# Patient Record
Sex: MALE | Race: WHITE | NOT HISPANIC OR LATINO | URBAN - METROPOLITAN AREA
[De-identification: names, ages, dates, MRNs, and addresses within clinical notes are randomized per-mention and may not be internally consistent; named-entity substitution may affect disease eponyms.]

---

## 2017-05-25 ENCOUNTER — OUTPATIENT (OUTPATIENT)
Dept: OUTPATIENT SERVICES | Facility: HOSPITAL | Age: 77
LOS: 1 days | Discharge: HOME | End: 2017-05-25

## 2017-06-28 DIAGNOSIS — B20 HUMAN IMMUNODEFICIENCY VIRUS [HIV] DISEASE: ICD-10-CM

## 2017-08-03 ENCOUNTER — OUTPATIENT (OUTPATIENT)
Dept: OUTPATIENT SERVICES | Facility: HOSPITAL | Age: 77
LOS: 1 days | Discharge: HOME | End: 2017-08-03

## 2017-08-03 DIAGNOSIS — B20 HUMAN IMMUNODEFICIENCY VIRUS [HIV] DISEASE: ICD-10-CM

## 2017-08-03 DIAGNOSIS — T40.1X1A POISONING BY HEROIN, ACCIDENTAL (UNINTENTIONAL), INITIAL ENCOUNTER: ICD-10-CM

## 2018-01-03 ENCOUNTER — INPATIENT (INPATIENT)
Facility: HOSPITAL | Age: 78
LOS: 5 days | Discharge: HOME | End: 2018-01-09
Attending: INTERNAL MEDICINE

## 2018-01-03 DIAGNOSIS — T40.1X1A POISONING BY HEROIN, ACCIDENTAL (UNINTENTIONAL), INITIAL ENCOUNTER: ICD-10-CM

## 2018-01-03 DIAGNOSIS — Z21 ASYMPTOMATIC HUMAN IMMUNODEFICIENCY VIRUS [HIV] INFECTION STATUS: ICD-10-CM

## 2018-01-12 DIAGNOSIS — F11.23 OPIOID DEPENDENCE WITH WITHDRAWAL: ICD-10-CM

## 2018-01-12 DIAGNOSIS — M10.9 GOUT, UNSPECIFIED: ICD-10-CM

## 2018-01-12 DIAGNOSIS — E87.6 HYPOKALEMIA: ICD-10-CM

## 2018-01-12 DIAGNOSIS — E83.42 HYPOMAGNESEMIA: ICD-10-CM

## 2018-01-12 DIAGNOSIS — T51.91XA TOXIC EFFECT OF UNSPECIFIED ALCOHOL, ACCIDENTAL (UNINTENTIONAL), INITIAL ENCOUNTER: ICD-10-CM

## 2018-01-12 DIAGNOSIS — B20 HUMAN IMMUNODEFICIENCY VIRUS [HIV] DISEASE: ICD-10-CM

## 2018-01-12 DIAGNOSIS — C85.90 NON-HODGKIN LYMPHOMA, UNSPECIFIED, UNSPECIFIED SITE: ICD-10-CM

## 2018-01-12 DIAGNOSIS — N17.9 ACUTE KIDNEY FAILURE, UNSPECIFIED: ICD-10-CM

## 2018-01-12 DIAGNOSIS — T40.7X1A POISONING BY CANNABIS (DERIVATIVES), ACCIDENTAL (UNINTENTIONAL), INITIAL ENCOUNTER: ICD-10-CM

## 2018-01-12 DIAGNOSIS — F41.1 GENERALIZED ANXIETY DISORDER: ICD-10-CM

## 2018-01-12 DIAGNOSIS — T40.1X1A POISONING BY HEROIN, ACCIDENTAL (UNINTENTIONAL), INITIAL ENCOUNTER: ICD-10-CM
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
## 2018-01-12 DIAGNOSIS — I12.9 HYPERTENSIVE CHRONIC KIDNEY DISEASE WITH STAGE 1 THROUGH STAGE 4 CHRONIC KIDNEY DISEASE, OR UNSPECIFIED CHRONIC KIDNEY DISEASE: ICD-10-CM

## 2018-01-12 DIAGNOSIS — H10.9 UNSPECIFIED CONJUNCTIVITIS: ICD-10-CM

## 2018-01-12 DIAGNOSIS — G92 TOXIC ENCEPHALOPATHY: ICD-10-CM

## 2018-01-12 DIAGNOSIS — F10.20 ALCOHOL DEPENDENCE, UNCOMPLICATED: ICD-10-CM
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
## 2018-05-31 ENCOUNTER — OUTPATIENT (OUTPATIENT)
Dept: OUTPATIENT SERVICES | Facility: HOSPITAL | Age: 78
LOS: 1 days | Discharge: HOME | End: 2018-05-31

## 2018-06-01 DIAGNOSIS — B20 HUMAN IMMUNODEFICIENCY VIRUS [HIV] DISEASE: ICD-10-CM

## 2018-10-04 ENCOUNTER — OUTPATIENT (OUTPATIENT)
Dept: OUTPATIENT SERVICES | Facility: HOSPITAL | Age: 78
LOS: 1 days | Discharge: HOME | End: 2018-10-04

## 2018-10-04 DIAGNOSIS — N42.9 DISORDER OF PROSTATE, UNSPECIFIED: ICD-10-CM

## 2018-10-04 DIAGNOSIS — B20 HUMAN IMMUNODEFICIENCY VIRUS [HIV] DISEASE: ICD-10-CM

## 2019-07-18 ENCOUNTER — OUTPATIENT (OUTPATIENT)
Dept: OUTPATIENT SERVICES | Facility: HOSPITAL | Age: 79
LOS: 1 days | Discharge: HOME | End: 2019-07-18

## 2019-07-18 ENCOUNTER — INPATIENT (INPATIENT)
Facility: HOSPITAL | Age: 79
LOS: 5 days | Discharge: NURSING, REHAB OR LONG-TERM CARE | End: 2019-07-24
Attending: INTERNAL MEDICINE | Admitting: INTERNAL MEDICINE
Payer: MEDICARE

## 2019-07-18 VITALS
OXYGEN SATURATION: 99 % | RESPIRATION RATE: 18 BRPM | HEART RATE: 70 BPM | TEMPERATURE: 97 F | DIASTOLIC BLOOD PRESSURE: 62 MMHG | SYSTOLIC BLOOD PRESSURE: 117 MMHG

## 2019-07-18 DIAGNOSIS — B20 HUMAN IMMUNODEFICIENCY VIRUS [HIV] DISEASE: ICD-10-CM

## 2019-07-18 LAB
ALBUMIN SERPL ELPH-MCNC: 3.2 G/DL — LOW (ref 3.5–5.2)
ALP SERPL-CCNC: 120 U/L — HIGH (ref 30–115)
ALT FLD-CCNC: 12 U/L — SIGNIFICANT CHANGE UP (ref 0–41)
ANION GAP SERPL CALC-SCNC: 11 MMOL/L — SIGNIFICANT CHANGE UP (ref 7–14)
AST SERPL-CCNC: 26 U/L — SIGNIFICANT CHANGE UP (ref 0–41)
BASOPHILS # BLD AUTO: 0.01 K/UL — SIGNIFICANT CHANGE UP (ref 0–0.2)
BASOPHILS NFR BLD AUTO: 0.1 % — SIGNIFICANT CHANGE UP (ref 0–1)
BILIRUB SERPL-MCNC: 0.6 MG/DL — SIGNIFICANT CHANGE UP (ref 0.2–1.2)
BUN SERPL-MCNC: 22 MG/DL — HIGH (ref 10–20)
CALCIUM SERPL-MCNC: 8.5 MG/DL — SIGNIFICANT CHANGE UP (ref 8.5–10.1)
CHLORIDE SERPL-SCNC: 107 MMOL/L — SIGNIFICANT CHANGE UP (ref 98–110)
CO2 SERPL-SCNC: 20 MMOL/L — SIGNIFICANT CHANGE UP (ref 17–32)
CREAT SERPL-MCNC: 1.4 MG/DL — SIGNIFICANT CHANGE UP (ref 0.7–1.5)
EOSINOPHIL # BLD AUTO: 0.01 K/UL — SIGNIFICANT CHANGE UP (ref 0–0.7)
EOSINOPHIL NFR BLD AUTO: 0.1 % — SIGNIFICANT CHANGE UP (ref 0–8)
ERYTHROCYTE [SEDIMENTATION RATE] IN BLOOD: 33 MM/HR — HIGH (ref 0–10)
GLUCOSE SERPL-MCNC: 112 MG/DL — HIGH (ref 70–99)
HCT VFR BLD CALC: 36.8 % — LOW (ref 42–52)
HGB BLD-MCNC: 11.7 G/DL — LOW (ref 14–18)
IMM GRANULOCYTES NFR BLD AUTO: 0.7 % — HIGH (ref 0.1–0.3)
LACTATE SERPL-SCNC: 1.6 MMOL/L — SIGNIFICANT CHANGE UP (ref 0.5–2.2)
LYMPHOCYTES # BLD AUTO: 0.5 K/UL — LOW (ref 1.2–3.4)
LYMPHOCYTES # BLD AUTO: 5.3 % — LOW (ref 20.5–51.1)
MCHC RBC-ENTMCNC: 28.1 PG — SIGNIFICANT CHANGE UP (ref 27–31)
MCHC RBC-ENTMCNC: 31.8 G/DL — LOW (ref 32–37)
MCV RBC AUTO: 88.2 FL — SIGNIFICANT CHANGE UP (ref 80–94)
MONOCYTES # BLD AUTO: 0.57 K/UL — SIGNIFICANT CHANGE UP (ref 0.1–0.6)
MONOCYTES NFR BLD AUTO: 6 % — SIGNIFICANT CHANGE UP (ref 1.7–9.3)
NEUTROPHILS # BLD AUTO: 8.27 K/UL — HIGH (ref 1.4–6.5)
NEUTROPHILS NFR BLD AUTO: 87.8 % — HIGH (ref 42.2–75.2)
NRBC # BLD: 0 /100 WBCS — SIGNIFICANT CHANGE UP (ref 0–0)
PLATELET # BLD AUTO: 184 K/UL — SIGNIFICANT CHANGE UP (ref 130–400)
POTASSIUM SERPL-MCNC: 3.8 MMOL/L — SIGNIFICANT CHANGE UP (ref 3.5–5)
POTASSIUM SERPL-SCNC: 3.8 MMOL/L — SIGNIFICANT CHANGE UP (ref 3.5–5)
PROT SERPL-MCNC: 6.4 G/DL — SIGNIFICANT CHANGE UP (ref 6–8)
RBC # BLD: 4.17 M/UL — LOW (ref 4.7–6.1)
RBC # FLD: 14.4 % — SIGNIFICANT CHANGE UP (ref 11.5–14.5)
SODIUM SERPL-SCNC: 138 MMOL/L — SIGNIFICANT CHANGE UP (ref 135–146)
WBC # BLD: 9.43 K/UL — SIGNIFICANT CHANGE UP (ref 4.8–10.8)
WBC # FLD AUTO: 9.43 K/UL — SIGNIFICANT CHANGE UP (ref 4.8–10.8)

## 2019-07-18 PROCEDURE — 73610 X-RAY EXAM OF ANKLE: CPT | Mod: 26,LT

## 2019-07-18 PROCEDURE — 73590 X-RAY EXAM OF LOWER LEG: CPT | Mod: 26,LT

## 2019-07-18 PROCEDURE — 71045 X-RAY EXAM CHEST 1 VIEW: CPT | Mod: 26

## 2019-07-18 PROCEDURE — 93010 ELECTROCARDIOGRAM REPORT: CPT

## 2019-07-18 PROCEDURE — 70450 CT HEAD/BRAIN W/O DYE: CPT | Mod: 26

## 2019-07-18 PROCEDURE — 73552 X-RAY EXAM OF FEMUR 2/>: CPT | Mod: 26,LT

## 2019-07-18 PROCEDURE — 72170 X-RAY EXAM OF PELVIS: CPT | Mod: 26

## 2019-07-18 PROCEDURE — 73562 X-RAY EXAM OF KNEE 3: CPT | Mod: 26,LT

## 2019-07-18 PROCEDURE — 73700 CT LOWER EXTREMITY W/O DYE: CPT | Mod: 26,LT

## 2019-07-18 PROCEDURE — 99285 EMERGENCY DEPT VISIT HI MDM: CPT

## 2019-07-18 RX ORDER — ERGOCALCIFEROL 1.25 MG/1
1 CAPSULE ORAL
Qty: 0 | Refills: 0 | DISCHARGE

## 2019-07-18 RX ORDER — FOLIC ACID 0.8 MG
1 TABLET ORAL
Qty: 0 | Refills: 0 | DISCHARGE

## 2019-07-18 RX ORDER — ERGOCALCIFEROL 1.25 MG/1
50000 CAPSULE ORAL
Refills: 0 | Status: DISCONTINUED | OUTPATIENT
Start: 2019-07-18 | End: 2019-07-24

## 2019-07-18 RX ORDER — CHLORHEXIDINE GLUCONATE 213 G/1000ML
1 SOLUTION TOPICAL
Refills: 0 | Status: DISCONTINUED | OUTPATIENT
Start: 2019-07-18 | End: 2019-07-24

## 2019-07-18 RX ORDER — QUETIAPINE FUMARATE 200 MG/1
0 TABLET, FILM COATED ORAL
Qty: 0 | Refills: 0 | DISCHARGE

## 2019-07-18 RX ORDER — FOLIC ACID 0.8 MG
1 TABLET ORAL DAILY
Refills: 0 | Status: DISCONTINUED | OUTPATIENT
Start: 2019-07-18 | End: 2019-07-24

## 2019-07-18 RX ORDER — HEPARIN SODIUM 5000 [USP'U]/ML
5000 INJECTION INTRAVENOUS; SUBCUTANEOUS EVERY 8 HOURS
Refills: 0 | Status: DISCONTINUED | OUTPATIENT
Start: 2019-07-18 | End: 2019-07-20

## 2019-07-18 RX ORDER — ABACAVIR SULFATE, DOLUTEGRAVIR SODIUM, LAMIVUDINE 60-5-30 MG
1 KIT ORAL
Qty: 0 | Refills: 0 | DISCHARGE

## 2019-07-18 RX ORDER — QUETIAPINE FUMARATE 200 MG/1
25 TABLET, FILM COATED ORAL DAILY
Refills: 0 | Status: DISCONTINUED | OUTPATIENT
Start: 2019-07-18 | End: 2019-07-21

## 2019-07-18 RX ORDER — QUETIAPINE FUMARATE 200 MG/1
0.5 TABLET, FILM COATED ORAL
Qty: 0 | Refills: 0 | DISCHARGE

## 2019-07-18 RX ADMIN — Medication 1 TABLET(S): at 22:00

## 2019-07-18 RX ADMIN — HEPARIN SODIUM 5000 UNIT(S): 5000 INJECTION INTRAVENOUS; SUBCUTANEOUS at 22:00

## 2019-07-18 NOTE — ED PROVIDER NOTE - OBJECTIVE STATEMENT
80 yo female, pmh of htn, gout, hiv followed by dr. samuels, presents to ed for left knee pain/. As per son isidro falls over past month, left knee pain since 14 days ago, worse with movement and weight bearing, min relief with otc meds, described as aching. Denies head injury, loc, rib pain, other joint pain, back pain, neck pain, qabd pain, nvd, ha, dizziness, numbness, tingling, rash, dysuria, nasal congestion, sore throat, fever, chills, redness. 80 yo male, pmh of htn, gout, hiv followed by dr. samuels, presents to ed for left knee pain/. As per son isidro falls over past month, left knee pain since 14 days ago, worse with movement and weight bearing, min relief with otc meds, described as aching. Denies head injury, loc, rib pain, other joint pain, back pain, neck pain, qabd pain, nvd, ha, dizziness, numbness, tingling, rash, dysuria, nasal congestion, sore throat, fever, chills, redness.

## 2019-07-18 NOTE — H&P ADULT - NSHPLABSRESULTS_GEN_ALL_CORE
11.7   9.43  )-----------( 184      ( 18 Jul 2019 13:00 )             36.8     138  |  107  |  22<H>  ----------------------------<  112<H>  3.8   |  20  |  1.4    Ca    8.5      18 Jul 2019 13:00    TPro  6.4  /  Alb  3.2<L>  /  TBili  0.6  /  DBili  x   /  AST  26  /  ALT  12  /  AlkPhos  120<H>  07-18    Lactate Trend  07-18 @ 13:00 Lactate:1.6     CAPILLARY BLOOD GLUCOSE    < from: CT Head No Cont (07.18.19 @ 14:34) >  IMPRESSION:   1.  Periventricular and subcortical white matter chronic small vessel   ischemic changes.  2.  No acute fractures, mass effect, midline shift or hemorrhage.    < end of copied text >    < from: Xray Knee 3 Views, Left (07.18.19 @ 13:04) >  Impression:  Comminuted medial femoral condyle fracture.  < end of copied text >

## 2019-07-18 NOTE — ED PROVIDER NOTE - NS ED ROS FT
Constitutional: (-) fever, (-) chills,   Eyes: (-) visual changes  ENT: (-)(-) nasal congestions, (-) sore throat   Cardiovascular: (-) chest pain, (-) syncope  Respiratory: (-) cough, (-) shortness of breath, (-) dyspnea,   Gastrointestinal: (-) vomiting, (-) diarrhea, (-)nausea,  Musculoskeletal: (-) neck pain, (-) back pain, (+) left knee pain   Integumentary: (-) rash, (-)color changes  Neurological: (-) headache, (-)loc, (-) dizziness, (-) tingling, (-)numbness, I  Peripheral Vascular: (-) pain while walking, (+) leg swelling, (-) color changes  :  (-)dysuria,   Allergic/Immunologic: (-) pruritus

## 2019-07-18 NOTE — ED PROVIDER NOTE - CARE PLAN
Principal Discharge DX:	Femoral condyle fracture  Secondary Diagnosis:	Inability to ambulate due to left knee Principal Discharge DX:	Femoral condyle fracture  Secondary Diagnosis:	Inability to ambulate due to left knee  Secondary Diagnosis:	Fall

## 2019-07-18 NOTE — ED PROVIDER NOTE - PHYSICAL EXAMINATION
Physical Exam    Vital Signs: I have reviewed the initial vital signs.  Constitutional: well-nourished, appears stated age, no acute distress  Eyes: Conjunctiva pink, Sclera clear, PERRLA, EOMI.  Cardiovascular: S1 and S2, regular rate, regular rhythm, well-perfused extremities, radial pulses equal and 2+  Respiratory: unlabored respiratory effort, clear to auscultation bilaterally no wheezing, rales and rhonchi  Gastrointestinal: soft, non-tender abdomen, no pulsatile mass, normal bowl sounds  Musculoskeletal: supple neck, no lower extremity edema, no midline tenderness, no c spine ttp, neck from, left knee rom limited 2/2 pain, other joints from.  Integumentary: warm, dry, no rash, effusion over left knee without redness or warmth  Neurologic: awake, alert, cranial nerves II-XII grossly intact, extremities’ motor and sensory functions grossly intact  Psychiatric: appropriate mood, appropriate affect

## 2019-07-18 NOTE — ED ADULT NURSE NOTE - OBJECTIVE STATEMENT
pt presents with left knee pain , left lower extremity swelling with decrease in activity over the last 2 months. pt denies chest pain or SOB. As per the son, pt hasn't been able to use his walker recently due to weakness and pain.

## 2019-07-18 NOTE — PROGRESS NOTE ADULT - ASSESSMENT
ASSESSMENT  80 yo M with AIDS, gout, HTN, active heroin use (snorting, 1mn ago), admitted after a fall a week ago, found to have a left medial epicondylar fx w/ intra-articular extension to trochlear groove    RECOMMENDATIONS  #LLE fracture  - appreciate Ortho following, f/u final plan  - CT neg    #Symptomatic AIDS  - CD4/VL sent from HIV clinic today  - abacavir 600 mG/dolutegravir 50 mG/lamiVUDine 300 mG 1  - trimethoprim   80 mG/sulfamethoxazole 400 mG 1    #Mood disorder, Etoh, drug use  - QUEtiapine 25  - Thiamine 100mg daily      Spectra [phone]

## 2019-07-18 NOTE — ED PROVIDER NOTE - PROGRESS NOTE DETAILS
ortho c/s, spoke with stephanie cruz ct scan of left extremity and knee immobilizer MAR aware of admission, ortho will follow, dr. taylor of [facility] aware of admission under dolly's service Spoke to CODY Álvarez about ct findings, aware. MAR, WIll discuss with pt and fu with ortho and follow vascular rec.

## 2019-07-18 NOTE — ED PROVIDER NOTE - ATTENDING CONTRIBUTION TO CARE
78 y/o male h/o HTN, Gout, HIVD, lives with son who reports multiple recent falls, now presents with left knee pain. Pain is constant, worse with certain movements and position, better with rest, he normally ambulates outside the house but has been ambulating only to the bathroom and with a walker since the pain started, denies LOC, paresthesias or other complaints at present.     No old chart available for review.  I have reviewed and agree with the nursing note, except as documented in my note.    VSS, awake, alert, non-toxic appearing, Head NC / NT, PERRL / EOMI, no dental injury, chest CTAB, chest wall NT, no w/r/r, +S1/S2, RRR, no m/r/g, abdomen soft, NT, ND, +BS, able to voluntarily actively rotate neck 45 degrees left and right on request, no midline spinal tenderness, no CVA tenderness, LLE; no hip, ankle or foot tenderness, lft knee; grossly swollen and diffusley ttp, slight ecchymoses, no gross deformity, crepitus, joint line tenderness, able to partially flex and extend knee with encouragement, motor and sensation intact distally, NV intact with 2+ DP pulses, no warmth, erythema, induration, fluctuance, lymphangitis or purulence, otherwise FROM all other extremities and no bony tenderness, alert and oriented to person, place, clear speech, cranial nerves II-XII are intact.

## 2019-07-18 NOTE — ED ADULT NURSE NOTE - NSIMPLEMENTINTERV_GEN_ALL_ED
Implemented All Fall with Harm Risk Interventions:  [place] to call system. Call bell, personal items and telephone within reach. Instruct patient to call for assistance. Room bathroom lighting operational. Non-slip footwear when patient is off stretcher. Physically safe environment: no spills, clutter or unnecessary equipment. Stretcher in lowest position, wheels locked, appropriate side rails in place. Provide visual cue, wrist band, yellow gown, etc. Monitor gait and stability. Monitor for mental status changes and reorient to person, place, and time. Review medications for side effects contributing to fall risk. Reinforce activity limits and safety measures with patient and family. Provide visual clues: red socks.

## 2019-07-18 NOTE — PROGRESS NOTE ADULT - SUBJECTIVE AND OBJECTIVE BOX
EUGENIEDARY  79y, Male  Allergy: No Known Allergies      CHIEF COMPLAINT: Left knee swelling and Pain (18 Jul 2019 16:11)      HPI:  80 yo M with AIDS, gout, HTN, active heroin use (snorting, 1mn ago), admitted after a fall a week ago, found to have a left medial epicondylar fx w/ intra-articular extension to trochlear groove. Reports that he tripped on his outdoor step and hit his head. No LOC. Denies any other recent falls. Pt denies HA, rhinorrhea, sore throat, cough, SOB, abd pain, diarrhea, n/v, dysuria, rash, arthralgias. No fever or chills. Reports adherence to HIV meds. Diagnosis was recent. Last seen in clinic 10/2018 and was seen today where labs were drawn and he was sent to the ER.    PAST MEDICAL & SURGICAL HISTORY:  Gout  HTN (hypertension)  HIV (human immunodeficiency virus infection)      FAMILY HISTORY      SOCIAL HISTORY    Substance Use (  ) never used  (  ) IVDU ( x ) Other: snorts heroin, last use 1 mn ago  Tobacco Usage:  (   ) never smoked   (   ) former smoker   ( x  ) current smoker   Alcohol Usage: ( x  ) social  (   ) daily use (   ) denies  Sexual History: na      ROS  General: Denies rigors, nightsweats  HEENT: Denies headache, rhinorrhea, sore throat, eye pain  CV: Denies CP, palpitations  PULM: Denies SOB, wheezing  GI: Denies abdominal pain, hematochezia/melena  : Denies dysuria, hematuria  MSK: as noted above   SKIN: Denies rash, lesions  NEURO: Denies paresthesias, weakness  PSYCH: Denies depression, anxiety    VITALS:  T(F): 96, Max: 97.4 (07-18-19 @ 12:03)  HR: 67  BP: 108/76  RR: 18Vital Signs Last 24 Hrs  T(C): 35.6 (18 Jul 2019 15:00), Max: 36.3 (18 Jul 2019 12:03)  T(F): 96 (18 Jul 2019 15:00), Max: 97.4 (18 Jul 2019 12:03)  HR: 67 (18 Jul 2019 15:00) (67 - 70)  BP: 108/76 (18 Jul 2019 15:00) (108/76 - 117/62)  BP(mean): --  RR: 18 (18 Jul 2019 15:00) (18 - 18)  SpO2: 99% (18 Jul 2019 15:00) (99% - 99%)    PHYSICAL EXAM:  Gen: NAD, resting in bed  HEENT: Normocephalic, atraumatic  Neck: supple, no lymphadenopathy  CV: Regular rate & regular rhythm  Lungs: decreased BS at bases, no fremitus  Abdomen: Soft, BS present  Ext: Warm, well perfused, LLE edema, in brace  Neuro: non focal, awake  Skin: no rash, no erythema  Lines: no phlebitis    TESTS & MEASUREMENTS:                        11.7   9.43  )-----------( 184      ( 18 Jul 2019 13:00 )             36.8     07-18    138  |  107  |  22<H>  ----------------------------<  112<H>  3.8   |  20  |  1.4    Ca    8.5      18 Jul 2019 13:00    TPro  6.4  /  Alb  3.2<L>  /  TBili  0.6  /  DBili  x   /  AST  26  /  ALT  12  /  AlkPhos  120<H>  07-18    eGFR if Non African American: 47 mL/min/1.73M2 (07-18-19 @ 13:00)  eGFR if African American: 54 mL/min/1.73M2 (07-18-19 @ 13:00)    LIVER FUNCTIONS - ( 18 Jul 2019 13:00 )  Alb: 3.2 g/dL / Pro: 6.4 g/dL / ALK PHOS: 120 U/L / ALT: 12 U/L / AST: 26 U/L / GGT: x                 Lactate, Blood: 1.6 mmol/L (07-18-19 @ 13:00)      INFECTIOUS DISEASES TESTING      RADIOLOGY & ADDITIONAL TESTS:  I have personally reviewed the last Chest xray  CXR      CT      CARDIOLOGY TESTING  12 Lead ECG:   Ventricular Rate 68 BPM    Atrial Rate 100 BPM    P-R Interval 180 ms    QRS Duration 104 ms    Q-T Interval 426 ms    QTC Calculation(Bezet) 452 ms    P Axis 65 degrees    R Axis -1 degrees    T Axis 27 degrees    Diagnosis Line *** Poor data quality, interpretation may be adversely affected  Sinus rhythm with Premature atrial complexes  Otherwise normal ECG    Confirmed by Addi Gay ([id number]) on 7/18/2019 2:48:29 PM (07-18-19 @ 14:33)      MEDICATIONS  abacavir 600 mG/dolutegravir 50 mG/lamiVUDine 300 mG 1  chlorhexidine 4% Liquid 1  ergocalciferol 31869  folic acid 1  heparin  Injectable 5000  multivitamin 1  predniSONE   Tablet 40  QUEtiapine 25  trimethoprim   80 mG/sulfamethoxazole 400 mG 1      ANTIBIOTICS:  abacavir 600 mG/dolutegravir 50 mG/lamiVUDine 300 mG 1 Tablet(s) Oral daily  trimethoprim   80 mG/sulfamethoxazole 400 mG 1 Tablet(s) Oral two times a day      No Known Allergies

## 2019-07-18 NOTE — H&P ADULT - HISTORY OF PRESENT ILLNESS
78 yo M  PMH: HTN (not on medications), gout (not on medications and HIV  cc: Left knee pain x 2 days  History: Patient notes left knee pain began two weeks ago. It is described as a dull sensation, tender to touch, nonradiated and constant. He notes he had a fall two days ago that worsened the pain. In addition patient notes that for the last week he has had swelling of the left knee area. He has never had swelling like this before in the past and the swelling began BEFORE the fall happened. He further states he has not taken any medications to help with pain or swelling. Patient notes that he uses a walker at home for ambulatory purposes. He notes the pain began before the fall and is worse with weight bearing activities. Patient notes this is not the first fall this month. Otherwise patient denies headaches fevers, chills, n/v/d/c, cp, palpitations sob, cough, weakness, numbness, tingling, abdominal or back pain. Patient denies dysuria, hematuria, or melena.

## 2019-07-18 NOTE — CONSULT NOTE ADULT - ATTENDING COMMENTS
Pt seen and examined.  Agree with resident exam and plan.  NWB LLE, pain control, already discussed with Dr. Stephenson.  He will review images for possible transfer.

## 2019-07-19 DIAGNOSIS — Z02.9 ENCOUNTER FOR ADMINISTRATIVE EXAMINATIONS, UNSPECIFIED: ICD-10-CM

## 2019-07-19 PROBLEM — I10 ESSENTIAL (PRIMARY) HYPERTENSION: Chronic | Status: ACTIVE | Noted: 2019-07-18

## 2019-07-19 PROBLEM — M10.9 GOUT, UNSPECIFIED: Chronic | Status: ACTIVE | Noted: 2019-07-18

## 2019-07-19 PROBLEM — B20 HUMAN IMMUNODEFICIENCY VIRUS [HIV] DISEASE: Chronic | Status: ACTIVE | Noted: 2019-07-18

## 2019-07-19 LAB
ANION GAP SERPL CALC-SCNC: 13 MMOL/L — SIGNIFICANT CHANGE UP (ref 7–14)
BASOPHILS # BLD AUTO: 0.02 K/UL — SIGNIFICANT CHANGE UP (ref 0–0.2)
BASOPHILS NFR BLD AUTO: 0.2 % — SIGNIFICANT CHANGE UP (ref 0–1)
BUN SERPL-MCNC: 23 MG/DL — HIGH (ref 10–20)
CALCIUM SERPL-MCNC: 8.3 MG/DL — LOW (ref 8.5–10.1)
CHLORIDE SERPL-SCNC: 106 MMOL/L — SIGNIFICANT CHANGE UP (ref 98–110)
CO2 SERPL-SCNC: 18 MMOL/L — SIGNIFICANT CHANGE UP (ref 17–32)
CREAT SERPL-MCNC: 1.4 MG/DL — SIGNIFICANT CHANGE UP (ref 0.7–1.5)
EOSINOPHIL # BLD AUTO: 0.06 K/UL — SIGNIFICANT CHANGE UP (ref 0–0.7)
EOSINOPHIL NFR BLD AUTO: 0.7 % — SIGNIFICANT CHANGE UP (ref 0–8)
GLUCOSE SERPL-MCNC: 102 MG/DL — HIGH (ref 70–99)
HCT VFR BLD CALC: 35.3 % — LOW (ref 42–52)
HGB BLD-MCNC: 11.6 G/DL — LOW (ref 14–18)
IMM GRANULOCYTES NFR BLD AUTO: 0.5 % — HIGH (ref 0.1–0.3)
LYMPHOCYTES # BLD AUTO: 1.13 K/UL — LOW (ref 1.2–3.4)
LYMPHOCYTES # BLD AUTO: 13.8 % — LOW (ref 20.5–51.1)
MAGNESIUM SERPL-MCNC: 1.9 MG/DL — SIGNIFICANT CHANGE UP (ref 1.8–2.4)
MCHC RBC-ENTMCNC: 28.4 PG — SIGNIFICANT CHANGE UP (ref 27–31)
MCHC RBC-ENTMCNC: 32.9 G/DL — SIGNIFICANT CHANGE UP (ref 32–37)
MCV RBC AUTO: 86.5 FL — SIGNIFICANT CHANGE UP (ref 80–94)
MONOCYTES # BLD AUTO: 0.49 K/UL — SIGNIFICANT CHANGE UP (ref 0.1–0.6)
MONOCYTES NFR BLD AUTO: 6 % — SIGNIFICANT CHANGE UP (ref 1.7–9.3)
NEUTROPHILS # BLD AUTO: 6.44 K/UL — SIGNIFICANT CHANGE UP (ref 1.4–6.5)
NEUTROPHILS NFR BLD AUTO: 78.8 % — HIGH (ref 42.2–75.2)
NRBC # BLD: 0 /100 WBCS — SIGNIFICANT CHANGE UP (ref 0–0)
PLATELET # BLD AUTO: 181 K/UL — SIGNIFICANT CHANGE UP (ref 130–400)
POTASSIUM SERPL-MCNC: 4 MMOL/L — SIGNIFICANT CHANGE UP (ref 3.5–5)
POTASSIUM SERPL-SCNC: 4 MMOL/L — SIGNIFICANT CHANGE UP (ref 3.5–5)
RBC # BLD: 4.08 M/UL — LOW (ref 4.7–6.1)
RBC # FLD: 14.5 % — SIGNIFICANT CHANGE UP (ref 11.5–14.5)
SODIUM SERPL-SCNC: 137 MMOL/L — SIGNIFICANT CHANGE UP (ref 135–146)
WBC # BLD: 8.18 K/UL — SIGNIFICANT CHANGE UP (ref 4.8–10.8)
WBC # FLD AUTO: 8.18 K/UL — SIGNIFICANT CHANGE UP (ref 4.8–10.8)

## 2019-07-19 PROCEDURE — 74177 CT ABD & PELVIS W/CONTRAST: CPT | Mod: 26

## 2019-07-19 PROCEDURE — 78320: CPT | Mod: 26

## 2019-07-19 PROCEDURE — 70491 CT SOFT TISSUE NECK W/DYE: CPT | Mod: 26

## 2019-07-19 PROCEDURE — 71260 CT THORAX DX C+: CPT | Mod: 26

## 2019-07-19 PROCEDURE — 78306 BONE IMAGING WHOLE BODY: CPT | Mod: 26

## 2019-07-19 RX ORDER — ACETAMINOPHEN 500 MG
650 TABLET ORAL EVERY 6 HOURS
Refills: 0 | Status: DISCONTINUED | OUTPATIENT
Start: 2019-07-19 | End: 2019-07-24

## 2019-07-19 RX ORDER — IOHEXOL 300 MG/ML
30 INJECTION, SOLUTION INTRAVENOUS ONCE
Refills: 0 | Status: COMPLETED | OUTPATIENT
Start: 2019-07-19 | End: 2019-07-19

## 2019-07-19 RX ADMIN — Medication 650 MILLIGRAM(S): at 09:07

## 2019-07-19 RX ADMIN — Medication 1 TABLET(S): at 13:55

## 2019-07-19 RX ADMIN — HEPARIN SODIUM 5000 UNIT(S): 5000 INJECTION INTRAVENOUS; SUBCUTANEOUS at 21:54

## 2019-07-19 RX ADMIN — ERGOCALCIFEROL 50000 UNIT(S): 1.25 CAPSULE ORAL at 14:54

## 2019-07-19 RX ADMIN — HEPARIN SODIUM 5000 UNIT(S): 5000 INJECTION INTRAVENOUS; SUBCUTANEOUS at 06:16

## 2019-07-19 RX ADMIN — CHLORHEXIDINE GLUCONATE 1 APPLICATION(S): 213 SOLUTION TOPICAL at 06:15

## 2019-07-19 RX ADMIN — QUETIAPINE FUMARATE 25 MILLIGRAM(S): 200 TABLET, FILM COATED ORAL at 13:53

## 2019-07-19 RX ADMIN — Medication 1 TABLET(S): at 06:15

## 2019-07-19 RX ADMIN — Medication 1 TABLET(S): at 18:19

## 2019-07-19 RX ADMIN — Medication 40 MILLIGRAM(S): at 06:15

## 2019-07-19 RX ADMIN — IOHEXOL 30 MILLILITER(S): 300 INJECTION, SOLUTION INTRAVENOUS at 16:27

## 2019-07-19 RX ADMIN — Medication 650 MILLIGRAM(S): at 18:21

## 2019-07-19 RX ADMIN — Medication 1 MILLIGRAM(S): at 13:56

## 2019-07-19 RX ADMIN — HEPARIN SODIUM 5000 UNIT(S): 5000 INJECTION INTRAVENOUS; SUBCUTANEOUS at 14:57

## 2019-07-19 RX ADMIN — Medication 650 MILLIGRAM(S): at 03:01

## 2019-07-19 NOTE — PROGRESS NOTE ADULT - SUBJECTIVE AND OBJECTIVE BOX
ORTHOPAEDIC FOLLOW UP NOTE    Per discussion with Dr. Stephenson. Plan is to:  - IR Guided biopsy today through direct lateral approach   - CT Neck, chest abdomen pelvis with and without contrast  - MRI entire femur with and without contrast  - Whole body bone scan   - CBC, CMP, ESR, CRP, LDH, SPEP/UPEP, PTH, CALCIUM, TSH, T3/T4. ORTHOPAEDIC FOLLOW UP NOTE    Per discussion with Dr. Stephenson. Plan is to:  - IR Guided biopsy today through direct lateral approach   - CT Neck, chest abdomen pelvis with and without contrast  - MRI entire femur with and without contrast  - Whole body bone scan   - CBC, CMP, ESR, CRP, LDH, SPEP/UPEP, PTH, CALCIUM, TSH, T3/T4.   left distal femur fx   pathologic??  lymphoma?  agree with workup

## 2019-07-19 NOTE — PROGRESS NOTE ADULT - SUBJECTIVE AND OBJECTIVE BOX
EUGENIEDARY  79y, Male  Allergy: No Known Allergies      CHIEF COMPLAINT: Left knee swelling and Pain (19 Jul 2019 09:23)      INTERVAL EVENTS/HPI  - No acute events overnight  - T(F): , Max: 97.9 (07-18-19 @ 23:00)  - Denies any worsening symptoms  - Tolerating medication  - WBC Count: 8.18 K/uL (07-19-19 @ 06:31)      ROS  General: Denies rigors, nightsweats  HEENT: Denies headache, rhinorrhea, sore throat, eye pain  CV: Denies CP, palpitations  PULM: Denies SOB, wheezing  GI: Denies abdominal pain, hematochezia/melena  : Denies dysuria, hematuria  MSK: Denies arthralgias, myalgias  SKIN: Denies rash, lesions  NEURO: Denies paresthesias, weakness  PSYCH: Denies depression, anxiety    VITALS:  T(F): 97.5, Max: 97.9 (07-18-19 @ 23:00)  HR: 67  BP: 125/72  RR: 18Vital Signs Last 24 Hrs  T(C): 36.4 (19 Jul 2019 08:09), Max: 36.6 (18 Jul 2019 23:00)  T(F): 97.5 (19 Jul 2019 08:09), Max: 97.9 (18 Jul 2019 23:00)  HR: 67 (19 Jul 2019 08:09) (67 - 73)  BP: 125/72 (19 Jul 2019 08:09) (108/76 - 155/91)  BP(mean): --  RR: 18 (19 Jul 2019 08:09) (18 - 18)  SpO2: 99% (18 Jul 2019 20:08) (99% - 99%)    PHYSICAL EXAM:  Gen: NAD, resting in bed  HEENT: Normocephalic, atraumatic  Neck: supple, no lymphadenopathy  CV: Regular rate & regular rhythm  Lungs: decreased BS at bases, no fremitus  Abdomen: Soft, BS present  Ext: Warm, well perfused, LLE edema, in brace  Neuro: non focal, awake  Skin: no rash, no erythema  Lines: no phlebitis    SOCIAL HISTORY  Substance Use (  ) never used  (  ) IVDU ( x ) Other: snorts heroin, last use 1 mn ago  Tobacco Usage:  (   ) never smoked   (   ) former smoker   ( x  ) current smoker   Alcohol Usage: ( x  ) social  (   ) daily use (   ) denies  Sexual History: na      FH: Non-contributory    TESTS & MEASUREMENTS:                        11.6   8.18  )-----------( 181      ( 19 Jul 2019 06:31 )             35.3     07-19    137  |  106  |  23<H>  ----------------------------<  102<H>  4.0   |  18  |  1.4    Ca    8.3<L>      19 Jul 2019 06:31  Mg     1.9     07-19    TPro  6.4  /  Alb  3.2<L>  /  TBili  0.6  /  DBili  x   /  AST  26  /  ALT  12  /  AlkPhos  120<H>  07-18    eGFR if Non African American: 47 mL/min/1.73M2 (07-19-19 @ 06:31)  eGFR if African American: 54 mL/min/1.73M2 (07-19-19 @ 06:31)    LIVER FUNCTIONS - ( 18 Jul 2019 13:00 )  Alb: 3.2 g/dL / Pro: 6.4 g/dL / ALK PHOS: 120 U/L / ALT: 12 U/L / AST: 26 U/L / GGT: x                 Lactate, Blood: 1.6 mmol/L (07-18-19 @ 13:00)      INFECTIOUS DISEASES TESTING      RADIOLOGY & ADDITIONAL TESTS:  I have personally reviewed the last Chest xray  CXR      CT      CARDIOLOGY TESTING  12 Lead ECG:   Ventricular Rate 68 BPM    Atrial Rate 100 BPM    P-R Interval 180 ms    QRS Duration 104 ms    Q-T Interval 426 ms    QTC Calculation(Bezet) 452 ms    P Axis 65 degrees    R Axis -1 degrees    T Axis 27 degrees    Diagnosis Line *** Poor data quality, interpretation may be adversely affected  Sinus rhythm with Premature atrial complexes  Otherwise normal ECG    Confirmed by Addi Gay ([id number]) on 7/18/2019 2:48:29 PM (07-18-19 @ 14:33)      MEDICATIONS  abacavir 600 mG/dolutegravir 50 mG/lamiVUDine 300 mG 1  chlorhexidine 4% Liquid 1  ergocalciferol 14180  folic acid 1  heparin  Injectable 5000  iohexol 300 mG (iodine)/mL Oral Solution 30  multivitamin 1  predniSONE   Tablet 40  QUEtiapine 25  trimethoprim   80 mG/sulfamethoxazole 400 mG 1      ANTIBIOTICS:  abacavir 600 mG/dolutegravir 50 mG/lamiVUDine 300 mG 1 Tablet(s) Oral daily  trimethoprim   80 mG/sulfamethoxazole 400 mG 1 Tablet(s) Oral two times a day

## 2019-07-19 NOTE — PROGRESS NOTE ADULT - ASSESSMENT
ASSESSMENT  78 yo M with AIDS, hx NHL, gout, HTN, active heroin use (snorting, 1mn ago), admitted after a fall a week ago, found to have a left medial epicondylar fx w/ intra-articular extension to trochlear groove, concerning for lymphoma, pathological fracture    RECOMMENDATIONS  #LLE fracture, concerning for pathological fx, lymphoma  - appreciate Ortho following  - IR Guided biopsy today through direct lateral approach   - CT Neck, chest abdomen pelvis with and without contrast  - MRI entire femur with and without contrast  - Whole body bone scan   - ESR, CRP, LDH, SPEP/UPEP, PTH, CALCIUM, TSH, T3/T4.   - CTH neg    #Symptomatic AIDS  - CD4/VL sent from HIV clinic  - abacavir 600 mG/dolutegravir 50 mG/lamiVUDine 300 mG 1  - trimethoprim   80 mG/sulfamethoxazole 400 mG 1    #Mood disorder, Etoh, drug use  - QUEtiapine 25  - Thiamine 100mg daily      Spectra [phone]

## 2019-07-19 NOTE — PROGRESS NOTE ADULT - ASSESSMENT
78 yo M with HIV, gout, HTN, active heroin use (snorting, 1mn ago), admitted after a fall two days ago, found to have a left medial epicondylar fx w/ intra-articular extension to trochlear groove, concerning for lymphoma, pathological fracture    RECOMMENDATIONS  #LLE fracture, concerning for pathological fx, lymphoma  - Ortho following  - IR Guided biopsy today through direct lateral approach   - CT Neck, chest abdomen pelvis with and without contrast  - MRI entire femur w/w/o contrast  - Whole body bone scan   - ESR 33 , calcium 8.3   -CRP, LDH, SPEP/UPEP, PTH,TSH, T3/T4.   - CTH neg  -Follow imaging results   -Monitor labs  -Working with PT     #Symptomatic AIDS  - CD4/VL pending from clinic   - abacavir 600 mG/dolutegravir 50 mG/lamiVUDine 300 mG 1  - trimethoprim  80 mG/sulfamethoxazole 400 mG 1    #Mood disorder, ETOH abuse  - QUEtiapine 25  - Thiamine 100mg daily 78 yo M with HIV, gout, HTN, active heroin use (snorting, 1mn ago), admitted after a fall two days ago, found to have a left medial epicondylar fx w/ intra-articular extension to trochlear groove, concerning for lymphoma, pathological fracture    RECOMMENDATIONS  #LLE fracture, concerning for pathological fx, lymphoma  - Ortho following  - IR Guided biopsy today through direct lateral approach   - CT Neck, chest abdomen pelvis with and without contrast  - MRI entire femur w/w/o contrast  - Whole body bone scan   - ESR 33 , calcium 8.3   -CRP, LDH, SPEP/UPEP, PTH,TSH, T3/T4.   - CTH neg  -Follow imaging results   -Monitor labs  -Working with PT     #Vitamin D and B Deficiency  -Vitamin B-100 oral tablet: 1 tab(s) orally once a day  -Vitamin D2 50,000 intl units (1.25 mg) oral capsule: 1 cap(s) orally once a week      #Symptomatic AIDS  - CD4/VL pending from clinic   - abacavir 600 mG/dolutegravir 50 mG/lamiVUDine 300 mG 1  - trimethoprim  80 mG/sulfamethoxazole 400 mG 1    #Mood disorder, ETOH abuse  - QUEtiapine 25  - Thiamine 100mg daily

## 2019-07-19 NOTE — PHYSICAL THERAPY INITIAL EVALUATION ADULT - GENERAL OBSERVATIONS, REHAB EVAL
Pt encountered supine in bed in NAD, no c/o pain, (+) KI LLE/NWB per ortho. Pt requires Mod A in bed mobility, Max A in transfer mobility and non-ambulatory at this time.

## 2019-07-19 NOTE — PROGRESS NOTE ADULT - SUBJECTIVE AND OBJECTIVE BOX
Hospital Day:  1d    Subjective:    Patient is a 79y old  Male who presents with a chief complaint of Left knee swelling and Pain (19 Jul 2019 14:00)      Past Medical Hx:   Gout  HTN (hypertension)  HIV (human immunodeficiency virus infection)    Past Sx:    Allergies:  No Known Allergies    Current Meds:   Standng Meds:  abacavir 600 mG/dolutegravir 50 mG/lamiVUDine 300 mG 1 Tablet(s) Oral daily  chlorhexidine 4% Liquid 1 Application(s) Topical <User Schedule>  ergocalciferol 66989 Unit(s) Oral every week  folic acid 1 milliGRAM(s) Oral daily  heparin  Injectable 5000 Unit(s) SubCutaneous every 8 hours  iohexol 300 mG (iodine)/mL Oral Solution 30 milliLiter(s) Oral once  multivitamin 1 Tablet(s) Oral daily  predniSONE   Tablet 40 milliGRAM(s) Oral daily  QUEtiapine 25 milliGRAM(s) Oral daily  trimethoprim   80 mG/sulfamethoxazole 400 mG 1 Tablet(s) Oral two times a day    PRN Meds:  acetaminophen   Tablet .. 650 milliGRAM(s) Oral every 6 hours PRN Mild Pain (1 - 3)    HOME MEDICATIONS:  Bactrim 400 mg-80 mg oral tablet: 2 tab(s) orally 2 times a day  folic acid 1 mg oral tablet: 1 tab(s) orally once a day  SEROquel XR 50 mg oral tablet, extended release: 0.5 tab(s) orally once a day (in the evening)  Triumeq oral tablet: 1 tab(s) orally once a day  Vitamin B-100 oral tablet: 1 tab(s) orally once a day  Vitamin D2 50,000 intl units (1.25 mg) oral capsule: 1 cap(s) orally once a week      Vital Signs:   T(F): 97.5 (07-19-19 @ 08:09), Max: 97.9 (07-18-19 @ 23:00)  HR: 67 (07-19-19 @ 08:09) (67 - 73)  BP: 125/72 (07-19-19 @ 08:09) (118/80 - 155/91)  RR: 18 (07-19-19 @ 08:09) (18 - 18)  SpO2: 99% (07-18-19 @ 20:08) (99% - 99%)      07-19-19 @ 07:01 - 07-19-19 @ 15:41  --------------------------------------------------------  IN: 0 mL / OUT: 426 mL / NET: -426 mL        Physical Exam:   GENERAL: NAD  HEENT: NCAT  CHEST/LUNG: CTAB  HEART: Regular rate and rhythm; s1 s2 appreciated, No murmurs, rubs, or gallops  ABDOMEN: Soft, Nontender, Nondistended; Bowel sounds present  EXTREMITIES: No LE edema b/l  NERVOUS SYSTEM:  Alert & Oriented X3        Labs:                         11.6   8.18  )-----------( 181      ( 19 Jul 2019 06:31 )             35.3     Neutophil% 78.8, Lymphocyte% 13.8, Monocyte% 6.0, Bands% 0.5 07-19-19 @ 06:31    19 Jul 2019 06:31    137    |  106    |  23     ----------------------------<  102    4.0     |  18     |  1.4      Ca    8.3        19 Jul 2019 06:31  Mg     1.9       19 Jul 2019 06:31    TPro  6.4    /  Alb  3.2    /  TBili  0.6    /  DBili  x      /  AST  26     /  ALT  12     /  AlkPhos  120    18 Jul 2019 13:00                            Radiology:       Assessment and Plan:     DVT ppx:    GI ppx:     Code Status:     DISPO: Hospital Day:  1d    Subjective:    A 79y old  Male who presents with a chief complaint of Left knee swelling and Pain. Left knee pain began two weeks ago, described as a dull sensation, tender to touch, nonradiated and constant. He had a fall two days ago that worsened the pain. In addition patient notes that for the last week he has had swelling of the left knee area. Pain became worse after fall and pt decided to come to ED. Trauma workup revealed a medial left femoral condyle fracture and a mass concerning for lymphoma. There were no acute events overnight and pt remained stable, afebrile. He denies any physical complaints, including c/p,sob, n/v/d (had bm this am).     Past Medical Hx:   Gout  HTN (hypertension)  HIV (human immunodeficiency virus infection)    Past Sx:    Allergies:  No Known Allergies    Current Meds:   Standng Meds:  abacavir 600 mG/dolutegravir 50 mG/lamiVUDine 300 mG 1 Tablet(s) Oral daily  chlorhexidine 4% Liquid 1 Application(s) Topical <User Schedule>  ergocalciferol 32761 Unit(s) Oral every week  folic acid 1 milliGRAM(s) Oral daily  heparin  Injectable 5000 Unit(s) SubCutaneous every 8 hours  iohexol 300 mG (iodine)/mL Oral Solution 30 milliLiter(s) Oral once  multivitamin 1 Tablet(s) Oral daily  predniSONE   Tablet 40 milliGRAM(s) Oral daily  QUEtiapine 25 milliGRAM(s) Oral daily  trimethoprim   80 mG/sulfamethoxazole 400 mG 1 Tablet(s) Oral two times a day    PRN Meds:  acetaminophen   Tablet .. 650 milliGRAM(s) Oral every 6 hours PRN Mild Pain (1 - 3)    HOME MEDICATIONS:  Bactrim 400 mg-80 mg oral tablet: 2 tab(s) orally 2 times a day  folic acid 1 mg oral tablet: 1 tab(s) orally once a day  SEROquel XR 50 mg oral tablet, extended release: 0.5 tab(s) orally once a day (in the evening)  Triumeq oral tablet: 1 tab(s) orally once a day  Vitamin B-100 oral tablet: 1 tab(s) orally once a day  Vitamin D2 50,000 intl units (1.25 mg) oral capsule: 1 cap(s) orally once a week      Vital Signs:   T(F): 97.5 (07-19-19 @ 08:09), Max: 97.9 (07-18-19 @ 23:00)  HR: 67 (07-19-19 @ 08:09) (67 - 73)  BP: 125/72 (07-19-19 @ 08:09) (118/80 - 155/91)  RR: 18 (07-19-19 @ 08:09) (18 - 18)  SpO2: 99% (07-18-19 @ 20:08) (99% - 99%)      07-19-19 @ 07:01  -  07-19-19 @ 15:41  --------------------------------------------------------  IN: 0 mL / OUT: 426 mL / NET: -426 mL        Physical Exam:   GENERAL: NAD, appears younger than stated age, pleasant, talkative  HEENT: NCAT  CHEST/LUNG: CTAB  HEART: Regular rate and rhythm; s1 s2 appreciated, No murmurs, rubs, or gallops  ABDOMEN: Soft, Nontender, Nondistended; Bowel sounds present  EXTREMITIES: No LE edema b/l, left knee swollen, not tender to palpation, not erythematous or warm.  NERVOUS SYSTEM:  Alert & Oriented X3        Labs:                         11.6   8.18  )-----------( 181      ( 19 Jul 2019 06:31 )             35.3     Neutophil% 78.8, Lymphocyte% 13.8, Monocyte% 6.0, Bands% 0.5 07-19-19 @ 06:31    19 Jul 2019 06:31    137    |  106    |  23     ----------------------------<  102    4.0     |  18     |  1.4      Ca    8.3        19 Jul 2019 06:31  Mg     1.9       19 Jul 2019 06:31    TPro  6.4    /  Alb  3.2    /  TBili  0.6    /  DBili  x      /  AST  26     /  ALT  12     /  AlkPhos  120    18 Jul 2019 13:00

## 2019-07-19 NOTE — CONSULT NOTE ADULT - ASSESSMENT
IMPRESSION: Rehab of left femoral condyle fx    PRECAUTIONS: [  ] Cardiac  [  ] Respiratory  [  ] Seizures [  ] Contact Isolation  [  ] Droplet Isolation  [  ] Other    Weight Bearing Status: nwb LLE    RECOMMENDATION:    Out of Bed to Chair     DVT/Decubiti Prophylaxis    REHAB PLAN:     [ x  ] Bedside P/T 3-5 times a week   [   ]   Bedside O/T  2-3 times a week             [   ] No Rehab Therapy Indicated                   [   ]  Speech Therapy   Conditioning/ROM                                    ADL  Bed Mobility                                               Conditioning/ROM  Transfers                                                     Bed Mobility  Sitting /Standing Balance                         Transfers                                        Gait Training                                               Sitting/Standing Balance  Stair Training [   ]Applicable                    Home equipment Eval                                                                        Splinting  [   ] Only      GOALS:   ADL   [   ]   Independent                    Transfers  [ x  ] Independent                          Ambulation  [  x ] Independent     [  x  ] With device                            [   ]  CG                                                         [   ]  CG                                                                  [   ] CG                            [    ] Min A                                                   [   ] Min A                                                              [   ] Min  A          DISCHARGE PLAN:   [   ]  Good candidate for Intensive Rehabilitation/Hospital based                                             Will tolerate 3hrs Intensive Rehab Daily                                       [  x  ]  Short Term Rehab in Skilled Nursing Facility                   vs                    [  x  ]  Home with Outpatient or  services                                         [    ]  Possible Candidate for Intensive Hospital based Rehab

## 2019-07-20 LAB
ALBUMIN SERPL ELPH-MCNC: 3 G/DL — LOW (ref 3.5–5.2)
ALP SERPL-CCNC: 114 U/L — SIGNIFICANT CHANGE UP (ref 30–115)
ALT FLD-CCNC: 10 U/L — SIGNIFICANT CHANGE UP (ref 0–41)
ANION GAP SERPL CALC-SCNC: 13 MMOL/L — SIGNIFICANT CHANGE UP (ref 7–14)
APTT BLD: 36.8 SEC — SIGNIFICANT CHANGE UP (ref 27–39.2)
AST SERPL-CCNC: 16 U/L — SIGNIFICANT CHANGE UP (ref 0–41)
BASOPHILS # BLD AUTO: 0.01 K/UL — SIGNIFICANT CHANGE UP (ref 0–0.2)
BASOPHILS NFR BLD AUTO: 0.1 % — SIGNIFICANT CHANGE UP (ref 0–1)
BILIRUB SERPL-MCNC: 0.3 MG/DL — SIGNIFICANT CHANGE UP (ref 0.2–1.2)
BUN SERPL-MCNC: 23 MG/DL — HIGH (ref 10–20)
CALCIUM SERPL-MCNC: 8.8 MG/DL — SIGNIFICANT CHANGE UP (ref 8.5–10.1)
CHLORIDE SERPL-SCNC: 107 MMOL/L — SIGNIFICANT CHANGE UP (ref 98–110)
CO2 SERPL-SCNC: 20 MMOL/L — SIGNIFICANT CHANGE UP (ref 17–32)
CREAT SERPL-MCNC: 1.3 MG/DL — SIGNIFICANT CHANGE UP (ref 0.7–1.5)
EOSINOPHIL # BLD AUTO: 0.01 K/UL — SIGNIFICANT CHANGE UP (ref 0–0.7)
EOSINOPHIL NFR BLD AUTO: 0.1 % — SIGNIFICANT CHANGE UP (ref 0–8)
ERYTHROCYTE [SEDIMENTATION RATE] IN BLOOD: 43 MM/HR — HIGH (ref 0–10)
GLUCOSE SERPL-MCNC: 93 MG/DL — SIGNIFICANT CHANGE UP (ref 70–99)
HCT VFR BLD CALC: 35.8 % — LOW (ref 42–52)
HGB BLD-MCNC: 11.8 G/DL — LOW (ref 14–18)
IMM GRANULOCYTES NFR BLD AUTO: 0.6 % — HIGH (ref 0.1–0.3)
LDH SERPL L TO P-CCNC: 773 U/L — HIGH (ref 50–242)
LYMPHOCYTES # BLD AUTO: 1.03 K/UL — LOW (ref 1.2–3.4)
LYMPHOCYTES # BLD AUTO: 12.9 % — LOW (ref 20.5–51.1)
MCHC RBC-ENTMCNC: 28 PG — SIGNIFICANT CHANGE UP (ref 27–31)
MCHC RBC-ENTMCNC: 33 G/DL — SIGNIFICANT CHANGE UP (ref 32–37)
MCV RBC AUTO: 84.8 FL — SIGNIFICANT CHANGE UP (ref 80–94)
MONOCYTES # BLD AUTO: 0.44 K/UL — SIGNIFICANT CHANGE UP (ref 0.1–0.6)
MONOCYTES NFR BLD AUTO: 5.5 % — SIGNIFICANT CHANGE UP (ref 1.7–9.3)
NEUTROPHILS # BLD AUTO: 6.46 K/UL — SIGNIFICANT CHANGE UP (ref 1.4–6.5)
NEUTROPHILS NFR BLD AUTO: 80.8 % — HIGH (ref 42.2–75.2)
NRBC # BLD: 0 /100 WBCS — SIGNIFICANT CHANGE UP (ref 0–0)
PLATELET # BLD AUTO: 202 K/UL — SIGNIFICANT CHANGE UP (ref 130–400)
POTASSIUM SERPL-MCNC: 4 MMOL/L — SIGNIFICANT CHANGE UP (ref 3.5–5)
POTASSIUM SERPL-SCNC: 4 MMOL/L — SIGNIFICANT CHANGE UP (ref 3.5–5)
PROT SERPL-MCNC: 5.9 G/DL — LOW (ref 6–8.3)
PROT SERPL-MCNC: 5.9 G/DL — LOW (ref 6–8.3)
PROT SERPL-MCNC: 6 G/DL — SIGNIFICANT CHANGE UP (ref 6–8)
RBC # BLD: 4.22 M/UL — LOW (ref 4.7–6.1)
RBC # FLD: 14.3 % — SIGNIFICANT CHANGE UP (ref 11.5–14.5)
SODIUM SERPL-SCNC: 140 MMOL/L — SIGNIFICANT CHANGE UP (ref 135–146)
WBC # BLD: 8 K/UL — SIGNIFICANT CHANGE UP (ref 4.8–10.8)
WBC # FLD AUTO: 8 K/UL — SIGNIFICANT CHANGE UP (ref 4.8–10.8)

## 2019-07-20 PROCEDURE — 93970 EXTREMITY STUDY: CPT | Mod: 26

## 2019-07-20 RX ORDER — HEPARIN SODIUM 5000 [USP'U]/ML
1100 INJECTION INTRAVENOUS; SUBCUTANEOUS
Qty: 25000 | Refills: 0 | Status: DISCONTINUED | OUTPATIENT
Start: 2019-07-20 | End: 2019-07-22

## 2019-07-20 RX ADMIN — QUETIAPINE FUMARATE 25 MILLIGRAM(S): 200 TABLET, FILM COATED ORAL at 12:41

## 2019-07-20 RX ADMIN — HEPARIN SODIUM 5000 UNIT(S): 5000 INJECTION INTRAVENOUS; SUBCUTANEOUS at 15:04

## 2019-07-20 RX ADMIN — HEPARIN SODIUM 5000 UNIT(S): 5000 INJECTION INTRAVENOUS; SUBCUTANEOUS at 05:16

## 2019-07-20 RX ADMIN — Medication 1 MILLIGRAM(S): at 12:41

## 2019-07-20 RX ADMIN — Medication 650 MILLIGRAM(S): at 22:00

## 2019-07-20 RX ADMIN — Medication 650 MILLIGRAM(S): at 16:38

## 2019-07-20 RX ADMIN — Medication 1 TABLET(S): at 17:20

## 2019-07-20 RX ADMIN — Medication 650 MILLIGRAM(S): at 21:35

## 2019-07-20 RX ADMIN — Medication 40 MILLIGRAM(S): at 05:17

## 2019-07-20 RX ADMIN — HEPARIN SODIUM 11 UNIT(S)/HR: 5000 INJECTION INTRAVENOUS; SUBCUTANEOUS at 19:59

## 2019-07-20 RX ADMIN — Medication 1 TABLET(S): at 05:17

## 2019-07-20 RX ADMIN — Medication 650 MILLIGRAM(S): at 08:30

## 2019-07-20 RX ADMIN — Medication 650 MILLIGRAM(S): at 15:39

## 2019-07-20 RX ADMIN — Medication 1 TABLET(S): at 12:41

## 2019-07-20 RX ADMIN — Medication 650 MILLIGRAM(S): at 15:03

## 2019-07-20 NOTE — CHART NOTE - NSCHARTNOTEFT_GEN_A_CORE
Patient has deep venous thrombosis of the right femoral and popliteal veins. Superficial femoral phlebitis of the right gastrocnemius veins. No evidence of deep venous thrombosis or superficial thrombophlebitis in left lower extremity. No symptomatic from DVTs   Started heparin gtt (as benefits at the moment outweigh the risks of bleeding) at 1100 units per hour with PTTs for 2000, 2330 and AM     Follow up PTTs (if 2000 and 2330 doses still low no need to adjust as might be early still since drip was started at 2000), if high then decrease dose   Heparin gtt needs to be stopped monday AM 4-6 hours prior to IR guided biopsy

## 2019-07-20 NOTE — PROGRESS NOTE ADULT - ASSESSMENT
Assessment and Plan:    80 yo M with HIV, gout, HTN, active heroin use (snorting, 1mn ago), admitted after a fall two days ago, found to have a left medial epicondylar fx w/ intra-articular extension to trochlear groove, concerning for lymphoma, pathological fracture    #LLE fracture, concerning for pathological fx, lymphoma  - s/p IR guided biopsy, results pending  - Ortho following  - CT Neck, chest abdomen pelvis with and without contrast  - MRI entire femur w/w/o contrast  - Whole body bone scan   - ESR 33 , calcium 8.3   -CRP, LDH, SPEP/UPEP, PTH,TSH, T3/T4.   - CTH neg  -Follow imaging results   -Monitor labs  -Working with PT     #Symptomatic AIDS  - CD4/VL pending from clinic   - abacavir 600 mG/dolutegravir 50 mG/lamiVUDine 300 mG 1  - trimethoprim  80 mG/sulfamethoxazole 400 mG 1    #Mood disorder, ETOH abuse  - QUEtiapine 25  - Thiamine 100mg daily

## 2019-07-20 NOTE — PROGRESS NOTE ADULT - SUBJECTIVE AND OBJECTIVE BOX
Hospital Day:  2d    Subjective:    Patient is a 79y old  Male who presents with a chief complaint of Left knee swelling and Pain. Overnight he had no acute events. This morning he complains of moderate knee pain. He denies fevers, chills, sob, cp, abdominal pain, bowel or urinary sxs.      Past Medical Hx:   Gout  HTN (hypertension)  HIV (human immunodeficiency virus infection)    Past Sx:    Allergies:  No Known Allergies    Current Meds:   Standng Meds:  abacavir 600 mG/dolutegravir 50 mG/lamiVUDine 300 mG 1 Tablet(s) Oral daily  chlorhexidine 4% Liquid 1 Application(s) Topical <User Schedule>  ergocalciferol 20442 Unit(s) Oral every week  folic acid 1 milliGRAM(s) Oral daily  heparin  Injectable 5000 Unit(s) SubCutaneous every 8 hours  multivitamin 1 Tablet(s) Oral daily  predniSONE   Tablet 40 milliGRAM(s) Oral daily  QUEtiapine 25 milliGRAM(s) Oral daily  trimethoprim   80 mG/sulfamethoxazole 400 mG 1 Tablet(s) Oral two times a day    PRN Meds:  acetaminophen   Tablet .. 650 milliGRAM(s) Oral every 6 hours PRN Mild Pain (1 - 3)    HOME MEDICATIONS:  Bactrim 400 mg-80 mg oral tablet: 2 tab(s) orally 2 times a day  folic acid 1 mg oral tablet: 1 tab(s) orally once a day  SEROquel XR 50 mg oral tablet, extended release: 0.5 tab(s) orally once a day (in the evening)  Triumeq oral tablet: 1 tab(s) orally once a day  Vitamin B-100 oral tablet: 1 tab(s) orally once a day  Vitamin D2 50,000 intl units (1.25 mg) oral capsule: 1 cap(s) orally once a week      Vital Signs:   T(F): 96.5 (07-20-19 @ 15:36), Max: 97.4 (07-20-19 @ 08:00)  HR: 71 (07-20-19 @ 15:36) (62 - 71)  BP: 123/67 (07-20-19 @ 15:36) (123/67 - 142/68)  RR: 18 (07-20-19 @ 15:36) (18 - 19)  SpO2: --      07-19-19 @ 07:01  -  07-20-19 @ 07:00  --------------------------------------------------------  IN: 0 mL / OUT: 976 mL / NET: -976 mL    07-20-19 @ 07:01  -  07-20-19 @ 18:05  --------------------------------------------------------  IN: 0 mL / OUT: 300 mL / NET: -300 mL        Physical Exam:   GENERAL: NAD  HEENT: NCAT  CHEST/LUNG: CTA bl  HEART: Regular rate and rhythm; s1 s2 appreciated, No murmurs, rubs, or gallops  ABDOMEN: Soft, Nontender, Nondistended; Bowel sounds present  EXTREMITIES: No LE edema b/l. Left lower extremity in an immobilizer  NERVOUS SYSTEM:  Alert & Oriented X3        Labs:                         11.8   8.00  )-----------( 202      ( 20 Jul 2019 06:13 )             35.8     Neutophil% 80.8, Lymphocyte% 12.9, Monocyte% 5.5, Bands% 0.6 07-20-19 @ 06:13    20 Jul 2019 06:13    140    |  107    |  23     ----------------------------<  93     4.0     |  20     |  1.3      Ca    8.8        20 Jul 2019 06:13  Mg     1.9       19 Jul 2019 06:31    TPro  6.0    /  Alb  3.0    /  TBili  0.3    /  DBili  x      /  AST  16     /  ALT  10     /  AlkPhos  114    20 Jul 2019 06:13        Radiology:

## 2019-07-20 NOTE — PROGRESS NOTE ADULT - ASSESSMENT
ASSESSMENT  80 yo M with AIDS, hx NHL, gout, HTN, active heroin use (snorting, 1mn ago), admitted after a fall a week ago, found to have a left medial epicondylar fx w/ intra-articular extension to trochlear groove, concerning for lymphoma, pathological fracture    RECOMMENDATIONS  #LLE fracture, concerning for pathological fx, lymphoma  - appreciate Ortho following  - IR Guided biopsy  through direct lateral approach   - CT Neck, chest abdomen pelvis with and without contrast  - MRI femur   - Whole body bone scan   - ESR, CRP, LDH, SPEP/UPEP, PTH, CALCIUM, TSH, T3/T4.   - CTH neg    #Symptomatic AIDS  - CD4/VL sent from HIV clinic  - abacavir 600 mG/dolutegravir 50 mG/lamiVUDine 300 mG 1  - trimethoprim   80 mG/sulfamethoxazole 400 mG 1    #Mood disorder, Etoh, drug use  - QUEtiapine 25  - Thiamine 100mg daily

## 2019-07-21 LAB
ALBUMIN SERPL ELPH-MCNC: 3.1 G/DL — LOW (ref 3.5–5.2)
ALP SERPL-CCNC: 113 U/L — SIGNIFICANT CHANGE UP (ref 30–115)
ALT FLD-CCNC: 13 U/L — SIGNIFICANT CHANGE UP (ref 0–41)
ANION GAP SERPL CALC-SCNC: 13 MMOL/L — SIGNIFICANT CHANGE UP (ref 7–14)
APTT BLD: 33.4 SEC — SIGNIFICANT CHANGE UP (ref 27–39.2)
APTT BLD: 45.9 SEC — HIGH (ref 27–39.2)
APTT BLD: 49.8 SEC — HIGH (ref 27–39.2)
AST SERPL-CCNC: 18 U/L — SIGNIFICANT CHANGE UP (ref 0–41)
BASOPHILS # BLD AUTO: 0 K/UL — SIGNIFICANT CHANGE UP (ref 0–0.2)
BASOPHILS NFR BLD AUTO: 0 % — SIGNIFICANT CHANGE UP (ref 0–1)
BILIRUB SERPL-MCNC: 0.4 MG/DL — SIGNIFICANT CHANGE UP (ref 0.2–1.2)
BUN SERPL-MCNC: 24 MG/DL — HIGH (ref 10–20)
CALCIUM SERPL-MCNC: 8.9 MG/DL — SIGNIFICANT CHANGE UP (ref 8.5–10.1)
CHLORIDE SERPL-SCNC: 104 MMOL/L — SIGNIFICANT CHANGE UP (ref 98–110)
CO2 SERPL-SCNC: 19 MMOL/L — SIGNIFICANT CHANGE UP (ref 17–32)
CREAT SERPL-MCNC: 1.2 MG/DL — SIGNIFICANT CHANGE UP (ref 0.7–1.5)
CRP SERPL-MCNC: 11.91 MG/DL — HIGH (ref 0–0.4)
EOSINOPHIL # BLD AUTO: 0.01 K/UL — SIGNIFICANT CHANGE UP (ref 0–0.7)
EOSINOPHIL NFR BLD AUTO: 0.1 % — SIGNIFICANT CHANGE UP (ref 0–8)
GLUCOSE SERPL-MCNC: 96 MG/DL — SIGNIFICANT CHANGE UP (ref 70–99)
HCT VFR BLD CALC: 35.5 % — LOW (ref 42–52)
HGB BLD-MCNC: 11.9 G/DL — LOW (ref 14–18)
IMM GRANULOCYTES NFR BLD AUTO: 0.7 % — HIGH (ref 0.1–0.3)
LYMPHOCYTES # BLD AUTO: 0.95 K/UL — LOW (ref 1.2–3.4)
LYMPHOCYTES # BLD AUTO: 10.9 % — LOW (ref 20.5–51.1)
MCHC RBC-ENTMCNC: 28.4 PG — SIGNIFICANT CHANGE UP (ref 27–31)
MCHC RBC-ENTMCNC: 33.5 G/DL — SIGNIFICANT CHANGE UP (ref 32–37)
MCV RBC AUTO: 84.7 FL — SIGNIFICANT CHANGE UP (ref 80–94)
MONOCYTES # BLD AUTO: 0.31 K/UL — SIGNIFICANT CHANGE UP (ref 0.1–0.6)
MONOCYTES NFR BLD AUTO: 3.6 % — SIGNIFICANT CHANGE UP (ref 1.7–9.3)
NEUTROPHILS # BLD AUTO: 7.38 K/UL — HIGH (ref 1.4–6.5)
NEUTROPHILS NFR BLD AUTO: 84.7 % — HIGH (ref 42.2–75.2)
NRBC # BLD: 0 /100 WBCS — SIGNIFICANT CHANGE UP (ref 0–0)
PLATELET # BLD AUTO: 255 K/UL — SIGNIFICANT CHANGE UP (ref 130–400)
POTASSIUM SERPL-MCNC: 4.2 MMOL/L — SIGNIFICANT CHANGE UP (ref 3.5–5)
POTASSIUM SERPL-SCNC: 4.2 MMOL/L — SIGNIFICANT CHANGE UP (ref 3.5–5)
PROT SERPL-MCNC: 6.4 G/DL — SIGNIFICANT CHANGE UP (ref 6–8)
RBC # BLD: 4.19 M/UL — LOW (ref 4.7–6.1)
RBC # FLD: 14.4 % — SIGNIFICANT CHANGE UP (ref 11.5–14.5)
SODIUM SERPL-SCNC: 136 MMOL/L — SIGNIFICANT CHANGE UP (ref 135–146)
T3 SERPL-MCNC: 53 NG/DL — LOW (ref 80–200)
T4 AB SER-ACNC: 4.5 UG/DL — LOW (ref 4.6–12)
TSH SERPL-MCNC: 0.56 UIU/ML — SIGNIFICANT CHANGE UP (ref 0.27–4.2)
WBC # BLD: 8.71 K/UL — SIGNIFICANT CHANGE UP (ref 4.8–10.8)
WBC # FLD AUTO: 8.71 K/UL — SIGNIFICANT CHANGE UP (ref 4.8–10.8)

## 2019-07-21 RX ORDER — QUETIAPINE FUMARATE 200 MG/1
25 TABLET, FILM COATED ORAL AT BEDTIME
Refills: 0 | Status: DISCONTINUED | OUTPATIENT
Start: 2019-07-21 | End: 2019-07-24

## 2019-07-21 RX ADMIN — Medication 1 TABLET(S): at 17:05

## 2019-07-21 RX ADMIN — Medication 650 MILLIGRAM(S): at 18:54

## 2019-07-21 RX ADMIN — Medication 650 MILLIGRAM(S): at 09:44

## 2019-07-21 RX ADMIN — Medication 1 TABLET(S): at 12:43

## 2019-07-21 RX ADMIN — Medication 650 MILLIGRAM(S): at 03:35

## 2019-07-21 RX ADMIN — HEPARIN SODIUM 11 UNIT(S)/HR: 5000 INJECTION INTRAVENOUS; SUBCUTANEOUS at 21:28

## 2019-07-21 RX ADMIN — Medication 1 MILLIGRAM(S): at 12:43

## 2019-07-21 RX ADMIN — Medication 650 MILLIGRAM(S): at 03:13

## 2019-07-21 RX ADMIN — QUETIAPINE FUMARATE 25 MILLIGRAM(S): 200 TABLET, FILM COATED ORAL at 21:28

## 2019-07-21 RX ADMIN — Medication 650 MILLIGRAM(S): at 12:43

## 2019-07-21 RX ADMIN — CHLORHEXIDINE GLUCONATE 1 APPLICATION(S): 213 SOLUTION TOPICAL at 05:39

## 2019-07-21 RX ADMIN — Medication 40 MILLIGRAM(S): at 05:38

## 2019-07-21 RX ADMIN — Medication 1 TABLET(S): at 05:38

## 2019-07-21 NOTE — PROGRESS NOTE ADULT - SUBJECTIVE AND OBJECTIVE BOX
Hospital Day:  3d    Subjective:    Patient is a 79y old  Male who presents with a chief complaint of Left knee swelling and Pain (20 Jul 2019 18:05)      Past Medical Hx:   Gout  HTN (hypertension)  HIV (human immunodeficiency virus infection)    Past Sx:    Allergies:  No Known Allergies    Current Meds:   Standng Meds:  abacavir 600 mG/dolutegravir 50 mG/lamiVUDine 300 mG 1 Tablet(s) Oral daily  chlorhexidine 4% Liquid 1 Application(s) Topical <User Schedule>  ergocalciferol 55051 Unit(s) Oral every week  folic acid 1 milliGRAM(s) Oral daily  heparin  Infusion 1100 Unit(s)/Hr (11 mL/Hr) IV Continuous <Continuous>  multivitamin 1 Tablet(s) Oral daily  predniSONE   Tablet 40 milliGRAM(s) Oral daily  QUEtiapine 25 milliGRAM(s) Oral at bedtime  trimethoprim   80 mG/sulfamethoxazole 400 mG 1 Tablet(s) Oral two times a day    PRN Meds:  acetaminophen   Tablet .. 650 milliGRAM(s) Oral every 6 hours PRN Mild Pain (1 - 3)    HOME MEDICATIONS:  Bactrim 400 mg-80 mg oral tablet: 2 tab(s) orally 2 times a day  folic acid 1 mg oral tablet: 1 tab(s) orally once a day  SEROquel XR 50 mg oral tablet, extended release: 0.5 tab(s) orally once a day (in the evening)  Triumeq oral tablet: 1 tab(s) orally once a day  Vitamin B-100 oral tablet: 1 tab(s) orally once a day  Vitamin D2 50,000 intl units (1.25 mg) oral capsule: 1 cap(s) orally once a week      Vital Signs:   T(F): 98 (07-21-19 @ 07:48), Max: 98 (07-21-19 @ 07:48)  HR: 63 (07-21-19 @ 07:48) (63 - 71)  BP: 138/77 (07-21-19 @ 07:48) (123/67 - 145/84)  RR: 18 (07-21-19 @ 07:48) (18 - 18)  SpO2: --      07-20-19 @ 07:01  -  07-21-19 @ 07:00  --------------------------------------------------------  IN: 0 mL / OUT: 1200 mL / NET: -1200 mL        Physical Exam:   GENERAL: NAD  HEENT: NCAT  CHEST/LUNG: CTAB  HEART: Regular rate and rhythm; s1 s2 appreciated, No murmurs, rubs, or gallops  ABDOMEN: Soft, Nontender, Nondistended; Bowel sounds present  EXTREMITIES: No LE edema b/l  NERVOUS SYSTEM:  Alert & Oriented X3        Labs:                         11.9   8.71  )-----------( 255      ( 21 Jul 2019 07:35 )             35.5     Neutophil% 84.7, Lymphocyte% 10.9, Monocyte% 3.6, Bands% 0.7 07-21-19 @ 07:35    20 Jul 2019 06:13    140    |  107    |  23     ----------------------------<  93     4.0     |  20     |  1.3      Ca    8.8        20 Jul 2019 06:13    TPro  6.0    /  Alb  3.0    /  TBili  0.3    /  DBili  x      /  AST  16     /  ALT  10     /  AlkPhos  114    20 Jul 2019 06:13       pTT    45.9             ----< -- INR  (07-21-19 @ 07:35)    --        PT,    pTT    49.8             ----< -- INR  (07-21-19 @ 01:42)    --        PT,    pTT    36.8             ----< -- INR  (07-20-19 @ 20:00)    --        PT              TSH --, Free T4 --, T3 (total)53 07-20-19 @ 06:13 Hospital Day:  3d    Subjective:    A 79y old  Male who presents with a chief complaint of Left knee swelling and Pain. Left knee pain began two weeks ago, described as a dull sensation, tender to touch, nonradiated and constant. He had a fall two days ago that worsened the pain. In addition patient notes that for the last week he has had swelling of the left knee area. Pain became worse after fall and pt decided to come to ED. Trauma workup revealed a medial left femoral condyle fracture and a mass concerning for lymphoma. There were no acute events overnight and pt remained stable, afebrile. He denies any physical complaints, including c/p, sob, n/v/d. States although his left knee is swollen, it is not painful. Pt could not recall how the suffered the fall. Lives in apt with son.           Past Medical Hx:   Gout  HTN (hypertension)  HIV (human immunodeficiency virus infection)    Past Sx:    Allergies:  No Known Allergies    Current Meds:   Standng Meds:  abacavir 600 mG/dolutegravir 50 mG/lamiVUDine 300 mG 1 Tablet(s) Oral daily  chlorhexidine 4% Liquid 1 Application(s) Topical <User Schedule>  ergocalciferol 47390 Unit(s) Oral every week  folic acid 1 milliGRAM(s) Oral daily  heparin  Infusion 1100 Unit(s)/Hr (11 mL/Hr) IV Continuous <Continuous>  multivitamin 1 Tablet(s) Oral daily  predniSONE   Tablet 40 milliGRAM(s) Oral daily  QUEtiapine 25 milliGRAM(s) Oral at bedtime  trimethoprim   80 mG/sulfamethoxazole 400 mG 1 Tablet(s) Oral two times a day    PRN Meds:  acetaminophen   Tablet .. 650 milliGRAM(s) Oral every 6 hours PRN Mild Pain (1 - 3)    HOME MEDICATIONS:  Bactrim 400 mg-80 mg oral tablet: 2 tab(s) orally 2 times a day  folic acid 1 mg oral tablet: 1 tab(s) orally once a day  SEROquel XR 50 mg oral tablet, extended release: 0.5 tab(s) orally once a day (in the evening)  Triumeq oral tablet: 1 tab(s) orally once a day  Vitamin B-100 oral tablet: 1 tab(s) orally once a day  Vitamin D2 50,000 intl units (1.25 mg) oral capsule: 1 cap(s) orally once a week      Vital Signs:   T(F): 98 (07-21-19 @ 07:48), Max: 98 (07-21-19 @ 07:48)  HR: 63 (07-21-19 @ 07:48) (63 - 71)  BP: 138/77 (07-21-19 @ 07:48) (123/67 - 145/84)  RR: 18 (07-21-19 @ 07:48) (18 - 18)  SpO2: --      07-20-19 @ 07:01  -  07-21-19 @ 07:00  --------------------------------------------------------  IN: 0 mL / OUT: 1200 mL / NET: -1200 mL        Physical Exam:   GENERAL: NAD, appears younger than stated age, pleasant, talkative, answering questions appropriately  HEENT: NCAT  CHEST/LUNG: CTAB  HEART: Regular rate and rhythm; s1 s2 appreciated, No murmurs, rubs, or gallops  ABDOMEN: Soft, Nontender, Nondistended; Bowel sounds present  EXTREMITIES: No LE edema b/lleft knee swollen, not tender to palpation, not erythematous or warm.  NERVOUS SYSTEM:  Alert & Oriented X3        Labs:                         11.9   8.71  )-----------( 255      ( 21 Jul 2019 07:35 )             35.5     Neutophil% 84.7, Lymphocyte% 10.9, Monocyte% 3.6, Bands% 0.7 07-21-19 @ 07:35    20 Jul 2019 06:13    140    |  107    |  23     ----------------------------<  93     4.0     |  20     |  1.3      Ca    8.8        20 Jul 2019 06:13    TPro  6.0    /  Alb  3.0    /  TBili  0.3    /  DBili  x      /  AST  16     /  ALT  10     /  AlkPhos  114    20 Jul 2019 06:13       pTT    45.9             ----< -- INR  (07-21-19 @ 07:35)    --        PT,    pTT    49.8             ----< -- INR  (07-21-19 @ 01:42)    --        PT,    pTT    36.8             ----< -- INR  (07-20-19 @ 20:00)    --        PT              TSH --, Free T4 --, T3 (total)53 07-20-19 @ 06:13 Hospital Day:  3d    Subjective:    A 79y old Male who presents with a chief complaint of Left knee swelling and Pain. Left knee pain began two weeks ago, described as a dull sensation, tender to touch, nonradiated and constant. He had a fall two days ago that worsened the pain. In addition patient notes that for the last week he has had swelling of the left knee area. Pain became worse after fall and pt decided to come to ED. Trauma workup revealed a medial left femoral condyle fracture and a mass concerning for lymphoma. There were no acute events overnight and pt remained stable, afebrile. He denies any physical complaints, including c/p, sob, n/v/d. States although his left knee is swollen, it is not painful. Pt could not recall how the suffered the fall. Lives in apt with son.       Past Medical Hx:   Gout  HTN (hypertension)  HIV (human immunodeficiency virus infection)    Past Sx:    Allergies:  No Known Allergies    Current Meds:   Standng Meds:  abacavir 600 mG/dolutegravir 50 mG/lamiVUDine 300 mG 1 Tablet(s) Oral daily  chlorhexidine 4% Liquid 1 Application(s) Topical <User Schedule>  ergocalciferol 78218 Unit(s) Oral every week  folic acid 1 milliGRAM(s) Oral daily  heparin  Infusion 1100 Unit(s)/Hr (11 mL/Hr) IV Continuous <Continuous>  multivitamin 1 Tablet(s) Oral daily  predniSONE   Tablet 40 milliGRAM(s) Oral daily  QUEtiapine 25 milliGRAM(s) Oral at bedtime  trimethoprim   80 mG/sulfamethoxazole 400 mG 1 Tablet(s) Oral two times a day    PRN Meds:  acetaminophen   Tablet .. 650 milliGRAM(s) Oral every 6 hours PRN Mild Pain (1 - 3)    HOME MEDICATIONS:  Bactrim 400 mg-80 mg oral tablet: 2 tab(s) orally 2 times a day  folic acid 1 mg oral tablet: 1 tab(s) orally once a day  SEROquel XR 50 mg oral tablet, extended release: 0.5 tab(s) orally once a day (in the evening)  Triumeq oral tablet: 1 tab(s) orally once a day  Vitamin B-100 oral tablet: 1 tab(s) orally once a day  Vitamin D2 50,000 intl units (1.25 mg) oral capsule: 1 cap(s) orally once a week      Vital Signs:   T(F): 98 (07-21-19 @ 07:48), Max: 98 (07-21-19 @ 07:48)  HR: 63 (07-21-19 @ 07:48) (63 - 71)  BP: 138/77 (07-21-19 @ 07:48) (123/67 - 145/84)  RR: 18 (07-21-19 @ 07:48) (18 - 18)  SpO2: --      07-20-19 @ 07:01  -  07-21-19 @ 07:00  --------------------------------------------------------  IN: 0 mL / OUT: 1200 mL / NET: -1200 mL        Physical Exam:   GENERAL: NAD, appears younger than stated age, pleasant, talkative, answering questions appropriately  HEENT: NCAT  CHEST/LUNG: CTAB  HEART: Regular rate and rhythm; s1 s2 appreciated, No murmurs, rubs, or gallops  ABDOMEN: Soft, Nontender, Nondistended; Bowel sounds present  EXTREMITIES: No LE edema b/lleft knee swollen, not tender to palpation, not erythematous or warm.  NERVOUS SYSTEM:  Alert & Oriented X3        Labs:                         11.9   8.71  )-----------( 255      ( 21 Jul 2019 07:35 )             35.5     Neutophil% 84.7, Lymphocyte% 10.9, Monocyte% 3.6, Bands% 0.7 07-21-19 @ 07:35    20 Jul 2019 06:13    140    |  107    |  23     ----------------------------<  93     4.0     |  20     |  1.3      Ca    8.8        20 Jul 2019 06:13    TPro  6.0    /  Alb  3.0    /  TBili  0.3    /  DBili  x      /  AST  16     /  ALT  10     /  AlkPhos  114    20 Jul 2019 06:13       pTT    45.9             ----< -- INR  (07-21-19 @ 07:35)    --        PT,    pTT    49.8             ----< -- INR  (07-21-19 @ 01:42)    --        PT,    pTT    36.8             ----< -- INR  (07-20-19 @ 20:00)    --        PT          TSH --, Free T4 --, T3 (total)53 07-20-19 @ 06:13

## 2019-07-21 NOTE — PROGRESS NOTE ADULT - ASSESSMENT
78 yo M with HIV, gout, HTN, active heroin use (snorting, 1mn ago), admitted after a fall two days ago, found to have a left medial epicondylar fx w/ intra-articular extension to trochlear groove, concerning for lymphoma, pathological fracture    # Right femoral and popliteal DVT  -heparin drip   -aPTT 33.4    #LLE fracture, concerning for pathological fx, lymphoma  - Ortho following  - IR Guided biopsy today through direct lateral approach   - CT Neck, chest abdomen pelvis with and without contrast  - MRI entire femur w/w/o contrast  - Whole body bone scan   - ESR 33 , calcium 8.3   -CRP, LDH, SPEP/UPEP, PTH,TSH, T3/T4.   - CTH neg  -Follow imaging results   -Monitor labs  -Working with PT     #Vitamin D and folic acid  -Folic acid 1mg daily  -Vitamin D2 50,000 intl units (1.25 mg) oral capsule: 1 cap(s) orally once a week  -Multivitamins      #Symptomatic AIDS  - CD4/VL pending from clinic   - abacavir 600 mG/dolutegravir 50 mG/lamiVUDine 300 mG 1  - trimethoprim  80 mG/sulfamethoxazole 400 mG 1    #Mood disorder, ETOH abuse  - QUEtiapine 25  - Thiamine 100mg daily    Activity: ambulate as tolerated  Diet : regular   DVT ppx: hep sc  Gi ppx: not indicated  Dispo: from home 80 yo M with HIV, gout, HTN, active heroin use (snorting, 1mn ago), admitted after a fall two days ago, found to have a left medial epicondylar fx w/ intra-articular extension to trochlear groove, concerning for lymphoma, pathological fracture    # Right femoral and popliteal DVT  -heparin drip   -aPTT 33.4    #LLE fracture, concerning for pathological fx, lymphoma  - Ortho following  - Pending IR Guided biopsy   - CT Neck, chest abdomen pelvis with and without contrast  - MRI entire femur w/w/o contrast  - Follow whole body bone scan result  - ESR 33 , calcium 8.3   -CRP, LDH, SPEP/UPEP, PTH,TSH, T3/T4.   - CTH neg  -Monitor labs  -Working with PT     #Vitamin D and folic acid  -Folic acid 1mg daily  -Vitamin D2 50,000 intl units (1.25 mg) oral capsule: 1 cap(s) orally once a week  -Multivitamins      #Symptomatic AIDS  - CD4/VL pending from clinic   - abacavir 600 mG/dolutegravir 50 mG/lamiVUDine 300 mG 1  - trimethoprim  80 mG/sulfamethoxazole 400 mG 1    #Mood disorder, ETOH abuse  - QUEtiapine 25  - Thiamine 100mg daily    Activity: ambulate as tolerated  Diet : regular   DVT ppx: hep sc  Gi ppx: not indicated  Dispo: from home 80 yo M with HIV, gout, HTN, active heroin use (snorting, 1mn ago), admitted after a fall two days ago, found to have a left medial epicondylar fx w/ intra-articular extension to trochlear groove, concerning for lymphoma, pathological fracture    # Right femoral and popliteal DVT  -heparin drip   -aPTT 33.4  -follow am labs    #LLE fracture, concerning for pathological fx, lymphoma  - Ortho following  - Pending IR Guided biopsy   - CT Neck, chest abdomen pelvis with and without contrast performed and read  - MRI entire femur w/w/o contrast performed and read  - Follow whole body bone scan result  - ESR 33 , calcium 8.3   -CRP (11.91), LDH (773), SPEP/UPEP, PTH,TSH (0.56), T3/T4 (53/4.5).   - CTH neg  -Monitor labs  -Working with PT     #Vitamin D and folic acid  -Folic acid 1mg daily  -Vitamin D2 50,000 intl units (1.25 mg) oral capsule: 1 cap(s) orally once a week  -Multivitamins      #Symptomatic AIDS  - CD4/VL pending from clinic   - abacavir 600 mG/dolutegravir 50 mG/lamiVUDine 300 mG 1  - trimethoprim  80 mG/sulfamethoxazole 400 mG 1    #Mood disorder, ETOH abuse  - QUEtiapine 25  - Thiamine 100mg daily    Activity: ambulate as tolerated  Diet : regular   DVT ppx: hep sc  Gi ppx: not indicated  Dispo: from home

## 2019-07-21 NOTE — PROGRESS NOTE ADULT - SUBJECTIVE AND OBJECTIVE BOX
EUGENIEDARY  79y, Male      OVERNIGHT EVENTS:    none    VITALS:  T(F): 98, Max: 98 (07-21-19 @ 07:48)  HR: 63  BP: 138/77  RR: 18Vital Signs Last 24 Hrs  T(C): 36.7 (21 Jul 2019 07:48), Max: 36.7 (21 Jul 2019 07:48)  T(F): 98 (21 Jul 2019 07:48), Max: 98 (21 Jul 2019 07:48)  HR: 63 (21 Jul 2019 07:48) (63 - 71)  BP: 138/77 (21 Jul 2019 07:48) (123/67 - 145/84)  BP(mean): --  RR: 18 (21 Jul 2019 07:48) (18 - 18)  SpO2: --    TESTS & MEASUREMENTS:                        11.9   8.71  )-----------( 255      ( 21 Jul 2019 07:35 )             35.5     07-21    136  |  104  |  24<H>  ----------------------------<  96  4.2   |  19  |  1.2    Ca    8.9      21 Jul 2019 07:35    TPro  6.4  /  Alb  3.1<L>  /  TBili  0.4  /  DBili  x   /  AST  18  /  ALT  13  /  AlkPhos  113  07-21    LIVER FUNCTIONS - ( 21 Jul 2019 07:35 )  Alb: 3.1 g/dL / Pro: 6.4 g/dL / ALK PHOS: 113 U/L / ALT: 13 U/L / AST: 18 U/L / GGT: x                   RADIOLOGY & ADDITIONAL TESTS:    ANTIBIOTICS:  abacavir 600 mG/dolutegravir 50 mG/lamiVUDine 300 mG 1 Tablet(s) Oral daily  trimethoprim   80 mG/sulfamethoxazole 400 mG 1 Tablet(s) Oral two times a day

## 2019-07-21 NOTE — PROGRESS NOTE ADULT - ASSESSMENT
ASSESSMENT  78 yo M with AIDS, hx NHL, gout, HTN, active heroin use (snorting, 1mn ago), admitted after a fall a week ago, found to have a left medial epicondylar fx w/ intra-articular extension to trochlear groove, concerning for lymphoma, pathological fracture    RECOMMENDATIONS  #LLE fracture, concerning for pathological fx, lymphoma  - appreciate Ortho following  - IR Guided biopsy  through direct lateral approach   - CT Neck, chest abdomen pelvis with and without contrast  - MRI femur   - Whole body bone scan   - ESR, CRP, LDH, SPEP/UPEP, PTH, CALCIUM, TSH, T3/T4.   - CTH neg    #Symptomatic AIDS  - CD4/VL sent from HIV clinic  - abacavir 600 mG/dolutegravir 50 mG/lamiVUDine 300 mG 1  - trimethoprim   80 mG/sulfamethoxazole 400 mG 1    #Mood disorder, Etoh, drug use  - QUEtiapine 25  - Thiamine 100mg daily

## 2019-07-22 LAB
ALBUMIN SERPL ELPH-MCNC: 3.1 G/DL — LOW (ref 3.5–5.2)
ALP SERPL-CCNC: 115 U/L — SIGNIFICANT CHANGE UP (ref 30–115)
ALT FLD-CCNC: 16 U/L — SIGNIFICANT CHANGE UP (ref 0–41)
AMPHET UR-MCNC: NEGATIVE — SIGNIFICANT CHANGE UP
ANION GAP SERPL CALC-SCNC: 13 MMOL/L — SIGNIFICANT CHANGE UP (ref 7–14)
APTT BLD: 39.4 SEC — HIGH (ref 27–39.2)
AST SERPL-CCNC: 23 U/L — SIGNIFICANT CHANGE UP (ref 0–41)
BARBITURATES UR SCN-MCNC: NEGATIVE — SIGNIFICANT CHANGE UP
BASOPHILS # BLD AUTO: 0.02 K/UL — SIGNIFICANT CHANGE UP (ref 0–0.2)
BASOPHILS NFR BLD AUTO: 0.2 % — SIGNIFICANT CHANGE UP (ref 0–1)
BENZODIAZ UR-MCNC: NEGATIVE — SIGNIFICANT CHANGE UP
BILIRUB SERPL-MCNC: 0.4 MG/DL — SIGNIFICANT CHANGE UP (ref 0.2–1.2)
BUN SERPL-MCNC: 27 MG/DL — HIGH (ref 10–20)
CALCIUM SERPL-MCNC: 9.3 MG/DL — SIGNIFICANT CHANGE UP (ref 8.5–10.1)
CHLORIDE SERPL-SCNC: 102 MMOL/L — SIGNIFICANT CHANGE UP (ref 98–110)
CO2 SERPL-SCNC: 22 MMOL/L — SIGNIFICANT CHANGE UP (ref 17–32)
COCAINE METAB.OTHER UR-MCNC: NEGATIVE — SIGNIFICANT CHANGE UP
CREAT SERPL-MCNC: 1.3 MG/DL — SIGNIFICANT CHANGE UP (ref 0.7–1.5)
DRUG SCREEN 1, URINE RESULT: SIGNIFICANT CHANGE UP
EOSINOPHIL # BLD AUTO: 0.02 K/UL — SIGNIFICANT CHANGE UP (ref 0–0.7)
EOSINOPHIL NFR BLD AUTO: 0.2 % — SIGNIFICANT CHANGE UP (ref 0–8)
GLUCOSE SERPL-MCNC: 92 MG/DL — SIGNIFICANT CHANGE UP (ref 70–99)
HCT VFR BLD CALC: 37.3 % — LOW (ref 42–52)
HGB BLD-MCNC: 12.3 G/DL — LOW (ref 14–18)
IMM GRANULOCYTES NFR BLD AUTO: 0.8 % — HIGH (ref 0.1–0.3)
INR BLD: 0.99 RATIO — SIGNIFICANT CHANGE UP (ref 0.65–1.3)
LYMPHOCYTES # BLD AUTO: 1.53 K/UL — SIGNIFICANT CHANGE UP (ref 1.2–3.4)
LYMPHOCYTES # BLD AUTO: 18.1 % — LOW (ref 20.5–51.1)
MCHC RBC-ENTMCNC: 27.9 PG — SIGNIFICANT CHANGE UP (ref 27–31)
MCHC RBC-ENTMCNC: 33 G/DL — SIGNIFICANT CHANGE UP (ref 32–37)
MCV RBC AUTO: 84.6 FL — SIGNIFICANT CHANGE UP (ref 80–94)
METHADONE UR-MCNC: NEGATIVE — SIGNIFICANT CHANGE UP
MONOCYTES # BLD AUTO: 0.54 K/UL — SIGNIFICANT CHANGE UP (ref 0.1–0.6)
MONOCYTES NFR BLD AUTO: 6.4 % — SIGNIFICANT CHANGE UP (ref 1.7–9.3)
NEUTROPHILS # BLD AUTO: 6.28 K/UL — SIGNIFICANT CHANGE UP (ref 1.4–6.5)
NEUTROPHILS NFR BLD AUTO: 74.3 % — SIGNIFICANT CHANGE UP (ref 42.2–75.2)
NRBC # BLD: 0 /100 WBCS — SIGNIFICANT CHANGE UP (ref 0–0)
OPIATES UR-MCNC: NEGATIVE — SIGNIFICANT CHANGE UP
PCP SPEC-MCNC: SIGNIFICANT CHANGE UP
PCP UR-MCNC: NEGATIVE — SIGNIFICANT CHANGE UP
PLATELET # BLD AUTO: 268 K/UL — SIGNIFICANT CHANGE UP (ref 130–400)
POTASSIUM SERPL-MCNC: 4.7 MMOL/L — SIGNIFICANT CHANGE UP (ref 3.5–5)
POTASSIUM SERPL-SCNC: 4.7 MMOL/L — SIGNIFICANT CHANGE UP (ref 3.5–5)
PROPOXYPHENE QUALITATIVE URINE RESULT: NEGATIVE — SIGNIFICANT CHANGE UP
PROT SERPL-MCNC: 6.6 G/DL — SIGNIFICANT CHANGE UP (ref 6–8)
PROTHROM AB SERPL-ACNC: 11.4 SEC — SIGNIFICANT CHANGE UP (ref 9.95–12.87)
RBC # BLD: 4.41 M/UL — LOW (ref 4.7–6.1)
RBC # FLD: 14.3 % — SIGNIFICANT CHANGE UP (ref 11.5–14.5)
SODIUM SERPL-SCNC: 137 MMOL/L — SIGNIFICANT CHANGE UP (ref 135–146)
THC UR QL: NEGATIVE — SIGNIFICANT CHANGE UP
WBC # BLD: 8.46 K/UL — SIGNIFICANT CHANGE UP (ref 4.8–10.8)
WBC # FLD AUTO: 8.46 K/UL — SIGNIFICANT CHANGE UP (ref 4.8–10.8)

## 2019-07-22 PROCEDURE — 88188 FLOWCYTOMETRY/READ 9-15: CPT

## 2019-07-22 PROCEDURE — 88365 INSITU HYBRIDIZATION (FISH): CPT | Mod: 26,59

## 2019-07-22 PROCEDURE — 76942 ECHO GUIDE FOR BIOPSY: CPT | Mod: 26

## 2019-07-22 PROCEDURE — 88360 TUMOR IMMUNOHISTOCHEM/MANUAL: CPT | Mod: 26

## 2019-07-22 PROCEDURE — 88341 IMHCHEM/IMCYTCHM EA ADD ANTB: CPT | Mod: 26,59

## 2019-07-22 PROCEDURE — 88360 TUMOR IMMUNOHISTOCHEM/MANUAL: CPT | Mod: 26,59

## 2019-07-22 PROCEDURE — 88305 TISSUE EXAM BY PATHOLOGIST: CPT | Mod: 26

## 2019-07-22 PROCEDURE — 88342 IMHCHEM/IMCYTCHM 1ST ANTB: CPT | Mod: 26,59

## 2019-07-22 PROCEDURE — 88333 PATH CONSLTJ SURG CYTO XM 1: CPT | Mod: 26

## 2019-07-22 PROCEDURE — 20220 BONE BIOPSY TROCAR/NDL SUPFC: CPT

## 2019-07-22 RX ORDER — APIXABAN 2.5 MG/1
10 TABLET, FILM COATED ORAL EVERY 12 HOURS
Refills: 0 | Status: DISCONTINUED | OUTPATIENT
Start: 2019-07-22 | End: 2019-07-24

## 2019-07-22 RX ADMIN — Medication 1 TABLET(S): at 17:17

## 2019-07-22 RX ADMIN — Medication 1 TABLET(S): at 05:31

## 2019-07-22 RX ADMIN — CHLORHEXIDINE GLUCONATE 1 APPLICATION(S): 213 SOLUTION TOPICAL at 05:32

## 2019-07-22 RX ADMIN — Medication 650 MILLIGRAM(S): at 22:13

## 2019-07-22 RX ADMIN — QUETIAPINE FUMARATE 25 MILLIGRAM(S): 200 TABLET, FILM COATED ORAL at 21:19

## 2019-07-22 RX ADMIN — Medication 1 MILLIGRAM(S): at 11:11

## 2019-07-22 RX ADMIN — Medication 1 TABLET(S): at 11:11

## 2019-07-22 RX ADMIN — APIXABAN 10 MILLIGRAM(S): 2.5 TABLET, FILM COATED ORAL at 17:17

## 2019-07-22 RX ADMIN — Medication 650 MILLIGRAM(S): at 05:31

## 2019-07-22 RX ADMIN — Medication 650 MILLIGRAM(S): at 23:14

## 2019-07-22 RX ADMIN — Medication 40 MILLIGRAM(S): at 05:31

## 2019-07-22 NOTE — PROGRESS NOTE ADULT - ASSESSMENT
80 yo M with HIV, gout, HTN, active heroin use (snorting, 1mn ago), admitted after a fall two days ago, found to have a left medial epicondylar fx w/ intra-articular extension to trochlear groove, concerning for lymphoma, pathological fracture    # Right femoral and popliteal DVT  -heparin drip   -aPTT 33.4  -follow am labs    #LLE fracture, concerning for pathological fx, lymphoma  - Ortho following  - Pending IR Guided biopsy   - CT Neck, chest abdomen pelvis with and without contrast performed and read  - MRI entire femur w/w/o contrast performed and read  - Follow whole body bone scan result  - ESR 33 , calcium 8.3   -CRP (11.91), LDH (773), SPEP/UPEP, PTH,TSH (0.56), T3/T4 (53/4.5).   - CTH neg  -Monitor labs  -Working with PT     #Vitamin D and folic acid  -Folic acid 1mg daily  -Vitamin D2 50,000 intl units (1.25 mg) oral capsule: 1 cap(s) orally once a week  -Multivitamins      #Symptomatic AIDS  - CD4/VL pending from clinic   - abacavir 600 mG/dolutegravir 50 mG/lamiVUDine 300 mG 1  - trimethoprim  80 mG/sulfamethoxazole 400 mG 1    #Mood disorder, ETOH abuse  - QUEtiapine 25  - Thiamine 100mg daily    Activity: ambulate as tolerated  Diet : regular   DVT ppx: hep sc  Gi ppx: not indicated  Dispo: from home 80 yo M with HIV, gout, HTN, active heroin use (snorting, 1mn ago), admitted after a fall two days ago, found to have a left medial epicondylar fx w/ intra-articular extension to trochlear groove, concerning for lymphoma, pathological fracture    # Right femoral and popliteal DVT  - Was on heparin drip. D/c'd prior to IR biopsy. Last aPTT 33.4  - Started on Eliquis after IR biopsy completed   - Follow am labs    # LLE fracture, concerning for pathological fx, lymphoma  - Ortho following  - IR-guided biopsy completed. Will follow-up results  - CT Neck, chest abdomen pelvis with and without contrast performed and read  - MRI entire femur w/w/o contrast performed and read  - Follow-up bone scan results  - ESR 33, calcium 8.3  - CRP (11.91), LDH (773), SPEP/UPEP, PTH,TSH (0.56), T3/T4 (53/4.5).   - CTH neg  - Will draw peripheral blood for flow cytometry. Heme/onc consulted. Will obtain records from outside Oncologist, Dr. Rios  - Monitor labs  - Working with PT     # Vitamin D and folic acid  - Folic acid 1mg daily  - Vitamin D2 50,000 international units (1.25 mg) oral capsule: 1 cap(s) orally once a week  - Multivitamins    #Symptomatic AIDS  - CD4/VL reviewed and d/w ID. Absolute CD4 <200, but 17%; patient actually with >200 CD4 but decreased on labs due to acute illness  - Abacavir 600 mG/dolutegravir 50 mG/lamiVUDine 300 mG 1  - trimethoprim  160 mG/sulfamethoxazole 400 mG 1x/day  - Ordered: TSH, T4, RPR, uric acid, LDH, Mg, Phos for AM. Will f/u    #Mood disorder, ETOH abuse  - QUEtiapine 25  - Thiamine 100mg daily    Activity: ambulate as tolerated  Diet : regular   DVT ppx: hep sc  Gi ppx: not indicated  Dispo: from home

## 2019-07-22 NOTE — PROGRESS NOTE ADULT - SUBJECTIVE AND OBJECTIVE BOX
DARY TRAORE  79y, Male  Allergy: No Known Allergies      CHIEF COMPLAINT: Left knee swelling and Pain (22 Jul 2019 15:32)      INTERVAL EVENTS/HPI  - No acute events overnight, s/p IR biopsy  - T(F): , Max: 97.4 (07-22-19 @ 16:00)  - Denies any worsening symptoms  - Tolerating medication  - WBC Count: 8.46 K/uL (07-22-19 @ 06:56)      ROS  General: Denies rigors, nightsweats  HEENT: Denies headache, rhinorrhea, sore throat, eye pain  CV: Denies CP, palpitations  PULM: Denies SOB, wheezing  GI: Denies abdominal pain, hematochezia/melena  : Denies dysuria, hematuria  MSK: Denies arthralgias, myalgias  SKIN: Denies rash, lesions  NEURO: Denies paresthesias, weakness  PSYCH: Denies depression, anxiety    VITALS:  T(F): 97.4, Max: 97.4 (07-22-19 @ 16:00)  HR: 71  BP: 141/81  RR: 19Vital Signs Last 24 Hrs  T(C): 36.3 (22 Jul 2019 16:00), Max: 36.3 (22 Jul 2019 16:00)  T(F): 97.4 (22 Jul 2019 16:00), Max: 97.4 (22 Jul 2019 16:00)  HR: 71 (22 Jul 2019 16:00) (67 - 71)  BP: 141/81 (22 Jul 2019 16:00) (140/82 - 147/91)  BP(mean): --  RR: 19 (22 Jul 2019 16:00) (18 - 19)  SpO2: --    PHYSICAL EXAM:  Gen: NAD, resting in bed  HEENT: Normocephalic, atraumatic  Neck: supple, no lymphadenopathy  CV: Regular rate & regular rhythm  Lungs: decreased BS at bases, no fremitus  Abdomen: Soft, BS present  Ext: Warm, well perfused, LLE brace  Neuro: non focal, awake  Skin: no rash, no erythema  Lines: no phlebitis    FH: Non-contributory  Social Hx: Non-contributory    TESTS & MEASUREMENTS:                        12.3   8.46  )-----------( 268      ( 22 Jul 2019 06:56 )             37.3     07-22    137  |  102  |  27<H>  ----------------------------<  92  4.7   |  22  |  1.3    Ca    9.3      22 Jul 2019 06:56    TPro  6.6  /  Alb  3.1<L>  /  TBili  0.4  /  DBili  x   /  AST  23  /  ALT  16  /  AlkPhos  115  07-22    eGFR if Non African American: 51 mL/min/1.73M2 (07-22-19 @ 06:56)  eGFR if : 59 mL/min/1.73M2 (07-22-19 @ 06:56)    LIVER FUNCTIONS - ( 22 Jul 2019 06:56 )  Alb: 3.1 g/dL / Pro: 6.6 g/dL / ALK PHOS: 115 U/L / ALT: 16 U/L / AST: 23 U/L / GGT: x                 Lactate, Blood: 1.6 mmol/L (07-18-19 @ 13:00)      INFECTIOUS DISEASES TESTING      RADIOLOGY & ADDITIONAL TESTS:  I have personally reviewed the last Chest xray  CXR      CT  CT Abdomen and Pelvis w/ Oral Cont and w/ IV Cont:   EXAM:  CT CHEST IC        EXAM:  CT ABDOMEN AND PELVIS OC IC          *** ADDENDUM 07/20/2019  ***    Technique: CT chest, abdomen and pelvis with IV and PO contrast   performed. Contiguous axial CT images were obtained from the thoracic   inlet  to the pubic symphysis following administration of 100 cc Optiray 320   intravenous contrast. 0 cc contrast discarded. Oral contrast was   administered. Reformatted images in the coronal and sagittal planes were   acquired. 3D (MIP) images obtained.      *** END OF ADDENDUM 07/20/2019  ***        PROCEDURE DATE:  07/19/2019            INTERPRETATION:  CLINICAL HISTORY/REASON FOR EXAM: Suspicion for   metastatic disease. Pain. Renal cystic mass. Thoracic aortic aneurysm.   HIV. Non-Hodgkin's lymphoma.    TECHNIQUE: Contiguous axial CT images were obtained from the thoracic   inlet to the pubic symphysis following administration of Optiray 320   intravenous contrast. Oral contrast was administered. Reformatted images   in the coronal and sagittalplanes were acquired. 3D (MIP) images   obtained.    COMPARISON: CT chest September 20, 2011.    FINDINGS:    CHEST:    LUNGS, PLEURA, AIRWAYS: No lobar consolidation, mass, effusion, or   pneumothorax. No evidence of central endobronchial obstruction. No   bronchiectasis or honeycombing. Elevation of left hemidiaphragm with   small Bochdalek diaphragmatic hernia. Bibasilar subsegmental atelectasis.   No suspicious nodules.    THORACIC NODES: Enlarged right paraesophageal 1.3 cm short axis lymph   node (series 12, image 216) and 1.4 cm short axis subcarinal lymph node   (series 12, image 119).    MEDIASTINUM/GREAT VESSELS: No pericardial effusion. Heart size is within   normal limits. Borderline aneurysmal dilation of thoracic aorta at 4.1   cm. Vascular calcifications. Calcified right thyroid lobe nodules.    ABDOMEN/PELVIS:    HEPATOBILIARY: Subcentimeter liver segment V hypodensity, too small to   characterize. Gallbladder unremarkable. No biliary dilation    SPLEEN: Unremarkable.    PANCREAS: Unremarkable.    ADRENAL GLANDS: 3.1 cm adrenal nodule common previously characterized as   adenoma 2011, stable. Left adrenal gland unremarkable.    KIDNEYS: Symmetric pattern of renal enhancement. No hydronephrosis   bilaterally. Right renal cyst and additional bilateral subcentimeter   renal hypodensities, too small to characterize    ABDOMINOPELVIC NODES: No lymphadenopathy.    PELVIC ORGANS: Unremarkable.    PERITONEUM/MESENTERY/BOWEL: No bowel obstruction. No ascites or   pneumoperitoneum.    BONES/SOFT TISSUES: Degenerative change, spine. Chronic right rib   fractures. Fracture deformity to left lateral 8-9th ribs; likely   posttraumatic.    OTHER: Vascular calcifications.      IMPRESSION:     1. New enlarged right paraesophageal 1.3 cm short axis lymph node and 1.4   cm short axis subcarinal lymph node; nonspecific and may be neoplastic.    2. Fracture deformity to left lateral 8-9th ribs; likely posttraumatic.          ***Please see the addendum at the top of this report.It may contain   additional important information or changes.****          SHIRLENE PENN M.D., ATTENDING RADIOLOGIST  This document has been electronically signed. Jul 20 2019  7:27AM  Addend:  SHIRLENE PENN M.D., ATTENDING RADIOLOGIST  This addendum waselectronically signed on: Jul 20 2019  7:45AM.       (07-19-19 @ 20:47)  CT Chest w/ IV Cont:   EXAM:  CT CHEST IC        EXAM:  CT ABDOMEN AND PELVIS OC IC          *** ADDENDUM 07/20/2019  ***    Technique: CT chest, abdomen and pelvis with IV and PO contrast   performed. Contiguous axial CT images were obtained from the thoracic   inlet  to the pubic symphysis following administration of 100 cc Optiray 320   intravenous contrast. 0 cc contrast discarded. Oral contrast was   administered. Reformatted images in the coronal and sagittal planes were   acquired. 3D (MIP) images obtained.      *** END OF ADDENDUM 07/20/2019  ***        PROCEDURE DATE:  07/19/2019            INTERPRETATION:  CLINICAL HISTORY/REASON FOR EXAM: Suspicion for   metastatic disease. Pain. Renal cystic mass. Thoracic aortic aneurysm.   HIV. Non-Hodgkin's lymphoma.    TECHNIQUE: Contiguous axial CT images were obtained from the thoracic   inlet to the pubic symphysis following administration of Optiray 320   intravenous contrast. Oral contrast was administered. Reformatted images   in the coronal and sagittalplanes were acquired. 3D (MIP) images   obtained.    COMPARISON: CT chest September 20, 2011.    FINDINGS:    CHEST:    LUNGS, PLEURA, AIRWAYS: No lobar consolidation, mass, effusion, or   pneumothorax. No evidence of central endobronchial obstruction. No   bronchiectasis or honeycombing. Elevation of left hemidiaphragm with   small Bochdalek diaphragmatic hernia. Bibasilar subsegmental atelectasis.   No suspicious nodules.    THORACIC NODES: Enlarged right paraesophageal 1.3 cm short axis lymph   node (series 12, image 216) and 1.4 cm short axis subcarinal lymph node   (series 12, image 119).    MEDIASTINUM/GREAT VESSELS: No pericardial effusion. Heart size is within   normal limits. Borderline aneurysmal dilation of thoracic aorta at 4.1   cm. Vascular calcifications. Calcified right thyroid lobe nodules.    ABDOMEN/PELVIS:    HEPATOBILIARY: Subcentimeter liver segment V hypodensity, too small to   characterize. Gallbladder unremarkable. No biliary dilation    SPLEEN: Unremarkable.    PANCREAS: Unremarkable.    ADRENAL GLANDS: 3.1 cm adrenal nodule common previously characterized as   adenoma 2011, stable. Left adrenal gland unremarkable.    KIDNEYS: Symmetric pattern of renal enhancement. No hydronephrosis   bilaterally. Right renal cyst and additional bilateral subcentimeter   renal hypodensities, too small to characterize    ABDOMINOPELVIC NODES: No lymphadenopathy.    PELVIC ORGANS: Unremarkable.    PERITONEUM/MESENTERY/BOWEL: No bowel obstruction. No ascites or   pneumoperitoneum.    BONES/SOFT TISSUES: Degenerative change, spine. Chronic right rib   fractures. Fracture deformity to left lateral 8-9th ribs; likely   posttraumatic.    OTHER: Vascular calcifications.      IMPRESSION:     1. New enlarged right paraesophageal 1.3 cm short axis lymph node and 1.4   cm short axis subcarinal lymph node; nonspecific and may be neoplastic.    2. Fracture deformity to left lateral 8-9th ribs; likely posttraumatic.          ***Please see the addendum at the top of this report.It may contain   additional important information or changes.****          SHIRLENE PENN M.D., ATTENDING RADIOLOGIST  This document has been electronically signed. Jul 20 2019  7:27AM  Addend:  SHIRLENE PENN M.D., ATTENDING RADIOLOGIST  This addendum waselectronically signed on: Jul 20 2019  7:45AM.       (07-19-19 @ 20:46)      CARDIOLOGY TESTING  12 Lead ECG:   Ventricular Rate 68 BPM    Atrial Rate 100 BPM    P-R Interval 180 ms    QRS Duration 104 ms    Q-T Interval 426 ms    QTC Calculation(Bezet) 452 ms    P Axis 65 degrees    R Axis -1 degrees    T Axis 27 degrees    Diagnosis Line *** Poor data quality, interpretation may be adversely affected  Sinus rhythm with Premature atrial complexes  Otherwise normal ECG    Confirmed by Addi Gay ([id number]) on 7/18/2019 2:48:29 PM (07-18-19 @ 14:33)      MEDICATIONS  abacavir 600 mG/dolutegravir 50 mG/lamiVUDine 300 mG 1  apixaban 10  chlorhexidine 4% Liquid 1  ergocalciferol 67995  folic acid 1  multivitamin 1  predniSONE   Tablet 40  QUEtiapine 25  trimethoprim  160 mG/sulfamethoxazole 800 mG 1      ANTIBIOTICS:  abacavir 600 mG/dolutegravir 50 mG/lamiVUDine 300 mG 1 Tablet(s) Oral daily  trimethoprim  160 mG/sulfamethoxazole 800 mG 1 Tablet(s) Oral daily

## 2019-07-22 NOTE — PROGRESS NOTE ADULT - ASSESSMENT
ASSESSMENT  80 yo M with AIDS, hx NHL, gout, HTN, active heroin use (snorting, 1mn ago), admitted after a fall a week ago, found to have a left medial epicondylar fx w/ intra-articular extension to trochlear groove, concerning for lymphoma, pathological fracture    RECOMMENDATIONS  #LLE fracture, concerning for pathological fx, concerning for recurrent lymphoma (Lactate Dehydrogenase, Serum: 773 U/L (07.20.19 @ 06:13)  CT imaging showing New enlarged right paraesophageal 1.3 cm short axis lymph node and 1.4 cm short axis subcarinal lymph node;  - appreciate Ortho following  - IR Guided biopsy pathology pending  - CT neg  - send uric acid  - repeat LDH  - Contact heme/onc  - Contact outpatient [facility] heme/onc for records    #Deep venous thrombosis of the right femoral and popliteal veins. Superficial femoral phlebitis of the right gastrocnemius veins.  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 CD4 178 but CD4% >14 (thus functionally greater than 200),   - abacavir 600 mG/dolutegravir 50 mG/lamiVUDine 300 mG 1  - trimethoprim  1 DS tab daily    Unclear why pt on prednisone    #Mood disorder, Etoh, drug use  - QUEtiapine 25  - Thiamine 100mg daily    Spectra [id number]

## 2019-07-22 NOTE — PROGRESS NOTE ADULT - SUBJECTIVE AND OBJECTIVE BOX
INTERVENTIONAL RADIOLOGY BRIEF-OPERATIVE NOTE    Procedure: Sono guided left knee mass biopsy    Pre-Op Diagnosis: Lymphoma    Post-Op Diagnosis:  Same    Attending: Lindsay  Resident:     Anesthesia (type):  [ ] General Anesthesia  [ ] Sedation  [ ] Spinal Anesthesia  [ x] Local/Regional    Contrast: 0cc    Estimated Blood Loss:  1cc    Condition:   [ ] Critical  [ ] Serious  [ ] Fair   [ x] Good    Findings/Follow up Plan of Care:  Vascular soft tissue mass distal femur    Specimens Removed:  6 cores    Implants:  NA    Complications:  none immediate    Disposition:  return to floor      Please call Interventional Radiology [phone] with any questions, concerns, or issues.

## 2019-07-22 NOTE — PROGRESS NOTE ADULT - SUBJECTIVE AND OBJECTIVE BOX
SUBJECTIVE:    Patient is a 79y old Male who presents with a chief complaint of Left knee swelling and Pain (21 Jul 2019 10:28). Currently admitted to medicine with the primary diagnosis of Femoral condyle fracture. Today is hospital day 4d. This morning he is resting comfortably in bed and reports no new issues or overnight events. Patient states he is feeling OK, although pain in his left knee is still present. No other complaints at this time.     PAST MEDICAL & SURGICAL HISTORY  Gout  HTN (hypertension)  HIV (human immunodeficiency virus infection)    ALLERGIES:  No Known Allergies    MEDICATIONS:  STANDING MEDICATIONS  abacavir 600 mG/dolutegravir 50 mG/lamiVUDine 300 mG 1 Tablet(s) Oral daily  chlorhexidine 4% Liquid 1 Application(s) Topical <User Schedule>  ergocalciferol 31219 Unit(s) Oral every week  folic acid 1 milliGRAM(s) Oral daily  multivitamin 1 Tablet(s) Oral daily  predniSONE   Tablet 40 milliGRAM(s) Oral daily  QUEtiapine 25 milliGRAM(s) Oral at bedtime  trimethoprim   80 mG/sulfamethoxazole 400 mG 1 Tablet(s) Oral two times a day    PRN MEDICATIONS  acetaminophen   Tablet .. 650 milliGRAM(s) Oral every 6 hours PRN    VITALS:   T(F): 97  HR: 69  BP: 140/82  RR: 18  SpO2: --    LABS:                        12.3   8.46  )-----------( 268      ( 22 Jul 2019 06:56 )             37.3     07-22    137  |  102  |  27<H>  ----------------------------<  92  4.7   |  22  |  1.3    Ca    9.3      22 Jul 2019 06:56    TPro  6.6  /  Alb  3.1<L>  /  TBili  0.4  /  DBili  x   /  AST  23  /  ALT  16  /  AlkPhos  115  07-22    PT/INR - ( 22 Jul 2019 06:56 )   PT: 11.40 sec;   INR: 0.99 ratio         PTT - ( 22 Jul 2019 06:56 )  PTT:39.4 sec    PHYSICAL EXAM:  GEN: No acute distress  PULM/CHEST: Clear to auscultation bilaterally, no rales, rhonchi or wheezes   CVS: Regular rate and rhythm, S1-S2, no murmurs  ABD: Soft, non-tender, non-distended, +BS  EXT: No edema. Left lower extremity extended in velcro brace. DP Pulses +2/4 b/l. No erythema noted  NEURO: AAOx3

## 2019-07-23 LAB
ALBUMIN SERPL ELPH-MCNC: 3.3 G/DL — LOW (ref 3.5–5.2)
ALP SERPL-CCNC: 123 U/L — HIGH (ref 30–115)
ALT FLD-CCNC: 22 U/L — SIGNIFICANT CHANGE UP (ref 0–41)
ANION GAP SERPL CALC-SCNC: 14 MMOL/L — SIGNIFICANT CHANGE UP (ref 7–14)
APTT BLD: 36.4 SEC — SIGNIFICANT CHANGE UP (ref 27–39.2)
AST SERPL-CCNC: 28 U/L — SIGNIFICANT CHANGE UP (ref 0–41)
BASOPHILS # BLD AUTO: 0.01 K/UL — SIGNIFICANT CHANGE UP (ref 0–0.2)
BASOPHILS NFR BLD AUTO: 0.1 % — SIGNIFICANT CHANGE UP (ref 0–1)
BILIRUB SERPL-MCNC: 0.4 MG/DL — SIGNIFICANT CHANGE UP (ref 0.2–1.2)
BUN SERPL-MCNC: 27 MG/DL — HIGH (ref 10–20)
CALCIUM SERPL-MCNC: 9.9 MG/DL — SIGNIFICANT CHANGE UP (ref 8.5–10.1)
CHLORIDE SERPL-SCNC: 101 MMOL/L — SIGNIFICANT CHANGE UP (ref 98–110)
CO2 SERPL-SCNC: 22 MMOL/L — SIGNIFICANT CHANGE UP (ref 17–32)
CREAT SERPL-MCNC: 1.4 MG/DL — SIGNIFICANT CHANGE UP (ref 0.7–1.5)
EOSINOPHIL # BLD AUTO: 0.02 K/UL — SIGNIFICANT CHANGE UP (ref 0–0.7)
EOSINOPHIL NFR BLD AUTO: 0.2 % — SIGNIFICANT CHANGE UP (ref 0–8)
GLUCOSE SERPL-MCNC: 85 MG/DL — SIGNIFICANT CHANGE UP (ref 70–99)
HCT VFR BLD CALC: 39.7 % — LOW (ref 42–52)
HGB BLD-MCNC: 13.1 G/DL — LOW (ref 14–18)
IMM GRANULOCYTES NFR BLD AUTO: 0.8 % — HIGH (ref 0.1–0.3)
INR BLD: 1.27 RATIO — SIGNIFICANT CHANGE UP (ref 0.65–1.3)
LDH SERPL L TO P-CCNC: 503 U/L — HIGH (ref 50–242)
LYMPHOCYTES # BLD AUTO: 1.59 K/UL — SIGNIFICANT CHANGE UP (ref 1.2–3.4)
LYMPHOCYTES # BLD AUTO: 18.2 % — LOW (ref 20.5–51.1)
MAGNESIUM SERPL-MCNC: 2.1 MG/DL — SIGNIFICANT CHANGE UP (ref 1.8–2.4)
MCHC RBC-ENTMCNC: 27.8 PG — SIGNIFICANT CHANGE UP (ref 27–31)
MCHC RBC-ENTMCNC: 33 G/DL — SIGNIFICANT CHANGE UP (ref 32–37)
MCV RBC AUTO: 84.3 FL — SIGNIFICANT CHANGE UP (ref 80–94)
MONOCYTES # BLD AUTO: 0.59 K/UL — SIGNIFICANT CHANGE UP (ref 0.1–0.6)
MONOCYTES NFR BLD AUTO: 6.8 % — SIGNIFICANT CHANGE UP (ref 1.7–9.3)
NEUTROPHILS # BLD AUTO: 6.46 K/UL — SIGNIFICANT CHANGE UP (ref 1.4–6.5)
NEUTROPHILS NFR BLD AUTO: 73.9 % — SIGNIFICANT CHANGE UP (ref 42.2–75.2)
NRBC # BLD: 0 /100 WBCS — SIGNIFICANT CHANGE UP (ref 0–0)
PHOSPHATE SERPL-MCNC: 3.7 MG/DL — SIGNIFICANT CHANGE UP (ref 2.1–4.9)
PLATELET # BLD AUTO: 287 K/UL — SIGNIFICANT CHANGE UP (ref 130–400)
POTASSIUM SERPL-MCNC: 4.9 MMOL/L — SIGNIFICANT CHANGE UP (ref 3.5–5)
POTASSIUM SERPL-SCNC: 4.9 MMOL/L — SIGNIFICANT CHANGE UP (ref 3.5–5)
PROT SERPL-MCNC: 6.9 G/DL — SIGNIFICANT CHANGE UP (ref 6–8)
PROTHROM AB SERPL-ACNC: 14.6 SEC — HIGH (ref 9.95–12.87)
RBC # BLD: 4.71 M/UL — SIGNIFICANT CHANGE UP (ref 4.7–6.1)
RBC # FLD: 14.4 % — SIGNIFICANT CHANGE UP (ref 11.5–14.5)
SODIUM SERPL-SCNC: 137 MMOL/L — SIGNIFICANT CHANGE UP (ref 135–146)
T PALLIDUM AB TITR SER: NEGATIVE — SIGNIFICANT CHANGE UP
T4 AB SER-ACNC: 4.6 UG/DL — SIGNIFICANT CHANGE UP (ref 4.6–12)
TSH SERPL-MCNC: 2.34 UIU/ML — SIGNIFICANT CHANGE UP (ref 0.27–4.2)
URATE SERPL-MCNC: 6.1 MG/DL — SIGNIFICANT CHANGE UP (ref 3.4–8.8)
WBC # BLD: 8.74 K/UL — SIGNIFICANT CHANGE UP (ref 4.8–10.8)
WBC # FLD AUTO: 8.74 K/UL — SIGNIFICANT CHANGE UP (ref 4.8–10.8)

## 2019-07-23 PROCEDURE — 88189 FLOWCYTOMETRY/READ 16 & >: CPT

## 2019-07-23 RX ORDER — KETOROLAC TROMETHAMINE 30 MG/ML
15 SYRINGE (ML) INJECTION ONCE
Refills: 0 | Status: DISCONTINUED | OUTPATIENT
Start: 2019-07-23 | End: 2019-07-23

## 2019-07-23 RX ADMIN — Medication 650 MILLIGRAM(S): at 17:50

## 2019-07-23 RX ADMIN — Medication 1 MILLIGRAM(S): at 12:02

## 2019-07-23 RX ADMIN — Medication 650 MILLIGRAM(S): at 09:37

## 2019-07-23 RX ADMIN — QUETIAPINE FUMARATE 25 MILLIGRAM(S): 200 TABLET, FILM COATED ORAL at 21:28

## 2019-07-23 RX ADMIN — Medication 15 MILLIGRAM(S): at 23:00

## 2019-07-23 RX ADMIN — Medication 15 MILLIGRAM(S): at 22:40

## 2019-07-23 RX ADMIN — APIXABAN 10 MILLIGRAM(S): 2.5 TABLET, FILM COATED ORAL at 17:54

## 2019-07-23 RX ADMIN — Medication 1 TABLET(S): at 12:01

## 2019-07-23 RX ADMIN — Medication 650 MILLIGRAM(S): at 23:56

## 2019-07-23 RX ADMIN — Medication 1 TABLET(S): at 12:04

## 2019-07-23 RX ADMIN — APIXABAN 10 MILLIGRAM(S): 2.5 TABLET, FILM COATED ORAL at 05:14

## 2019-07-23 NOTE — PROGRESS NOTE ADULT - ASSESSMENT
80 yo M with HIV, gout, HTN, active heroin use (snorting, 1mn ago), admitted after a fall two days ago, found to have a left medial epicondylar fx w/ intra-articular extension to trochlear groove, concerning for lymphoma, pathological fracture    # Right femoral and popliteal DVT  - On Eliquis  - Follow am labs  - Monitor for further sx as patient is NWB LLE in brace    # LLE fracture, concerning for pathological fx, lymphoma  - Ortho following. Recs appreciated regarding possible tx to Ortho-Onc with Dr. Stephenson  - IR-guided biopsy completed. Will follow-up results  - CT Neck, chest abdomen pelvis with and without contrast performed and read  - MRI entire femur w/w/o contrast performed and read  - Bone scan results:  1. Heterogeneous, abnormal osseous and extraosseous radiotracer uptake at   left distal femur, corresponding to left distal femoral mass with   pathologic fracture on CT.   2. No additional sites of suspicious osseous uptake.  3. Contiguous foci of increased radiotracer uptake at left lateral 8-9th   ribs, corresponding to posttraumatic-appearing fracture deformity on CT   with additional uptake at anterior left 7th rib, also likely   posttraumatic.  - ESR 33, calcium 8.3  - CRP (11.91), LDH (773), SPEP/UPEP, PTH,TSH (0.56), T3/T4 (53/4.5).   - CTH neg  - Peripheral blood for flow cytometry drawn today. WIll f/u results  - Heme/onc consulted. Consulted Dr. Hooker (covering Dr. Rios) oncology. Will follow recs  - Monitor labs  - Working with PT     # Vitamin D and folic acid  - Folic acid 1mg daily  - Vitamin D2 50,000 international units (1.25 mg) oral capsule: 1 cap(s) orally once a week  - Multivitamins    #Symptomatic AIDS  - CD4/VL reviewed and d/w ID. Absolute CD4 <200, but 17%; patient actually with >200 CD4 but decreased on labs due to acute illness  - Abacavir 600 mG/dolutegravir 50 mG/lamiVUDine 300 mG 1  - trimethoprim  160 mG/sulfamethoxazole 400 mG 1x/day  - Ordered: RPR. F/u result  - Uric acid WNL  - LDH elevated at 503  - TSH and T4 from 6/20: 0.56 (WNL) and 4.5 (slightly decreased), respectively  - Mg and Phos WNL    #Mood disorder, ETOH abuse  - Quetiapine 25  - Thiamine 100mg daily    Activity: NWB LLE. PT/Physiatry consult placed  Diet : regular   DVT ppx: hep sc  Gi ppx: not indicated  Dispo: from home. F/u ortho regarding plan of care for pathologic fracture

## 2019-07-23 NOTE — PROGRESS NOTE ADULT - SUBJECTIVE AND OBJECTIVE BOX
DARY TRAORE  79y, Male  Allergy: No Known Allergies      CHIEF COMPLAINT: Left knee swelling and Pain (22 Jul 2019 16:57)      INTERVAL EVENTS/HPI  - No acute events overnight  - T(F): , Max: 97.9 (07-23-19 @ 00:20)  - Denies any worsening symptoms  - Tolerating medication  - WBC Count: 8.74 K/uL (07-23-19 @ 05:35)      ROS  General: Denies rigors, nightsweats  HEENT: Denies headache, rhinorrhea, sore throat, eye pain  CV: Denies CP, palpitations  PULM: Denies SOB, wheezing  GI: Denies abdominal pain, hematochezia/melena  : Denies dysuria, hematuria  MSK: Denies arthralgias, myalgias  SKIN: Denies rash, lesions  NEURO: Denies paresthesias, weakness  PSYCH: Denies depression, anxiety    VITALS:  T(F): 96.2, Max: 97.9 (07-23-19 @ 00:20)  HR: 68  BP: 135/89  RR: 18Vital Signs Last 24 Hrs  T(C): 35.7 (23 Jul 2019 07:42), Max: 36.6 (23 Jul 2019 00:20)  T(F): 96.2 (23 Jul 2019 07:42), Max: 97.9 (23 Jul 2019 00:20)  HR: 68 (23 Jul 2019 07:42) (63 - 71)  BP: 135/89 (23 Jul 2019 07:42) (135/89 - 161/92)  BP(mean): --  RR: 18 (23 Jul 2019 07:42) (18 - 19)  SpO2: --    PHYSICAL EXAM:  Gen: NAD, resting in bed  HEENT: Normocephalic, atraumatic  Neck: supple, no lymphadenopathy  CV: Regular rate & regular rhythm  Lungs: decreased BS at bases, no fremitus  Abdomen: Soft, BS present  Ext: Warm, well perfused, LLE brace  Neuro: non focal, awake  Skin: no rash, no erythema  Lines: no phlebitis      FH: Non-contributory  Social Hx: Non-contributory    TESTS & MEASUREMENTS:                        13.1   8.74  )-----------( 287      ( 23 Jul 2019 05:35 )             39.7     07-23    137  |  101  |  27<H>  ----------------------------<  85  4.9   |  22  |  1.4    Ca    9.9      23 Jul 2019 05:35  Phos  3.7     07-23  Mg     2.1     07-23    TPro  6.9  /  Alb  3.3<L>  /  TBili  0.4  /  DBili  x   /  AST  28  /  ALT  22  /  AlkPhos  123<H>  07-23    eGFR if Non African American: 47 mL/min/1.73M2 (07-23-19 @ 05:35)  eGFR if African American: 54 mL/min/1.73M2 (07-23-19 @ 05:35)    LIVER FUNCTIONS - ( 23 Jul 2019 05:35 )  Alb: 3.3 g/dL / Pro: 6.9 g/dL / ALK PHOS: 123 U/L / ALT: 22 U/L / AST: 28 U/L / GGT: x                 Lactate, Blood: 1.6 mmol/L (07-18-19 @ 13:00)      INFECTIOUS DISEASES TESTING      RADIOLOGY & ADDITIONAL TESTS:  I have personally reviewed the last Chest xray  CXR      CT      CARDIOLOGY TESTING  12 Lead ECG:   Ventricular Rate 68 BPM    Atrial Rate 100 BPM    P-R Interval 180 ms    QRS Duration 104 ms    Q-T Interval 426 ms    QTC Calculation(Bezet) 452 ms    P Axis 65 degrees    R Axis -1 degrees    T Axis 27 degrees    Diagnosis Line *** Poor data quality, interpretation may be adversely affected  Sinus rhythm with Premature atrial complexes  Otherwise normal ECG    Confirmed by Addi Gay ([id number]) on 7/18/2019 2:48:29 PM (07-18-19 @ 14:33)      MEDICATIONS  abacavir 600 mG/dolutegravir 50 mG/lamiVUDine 300 mG 1  apixaban 10  chlorhexidine 4% Liquid 1  ergocalciferol 08919  folic acid 1  multivitamin 1  QUEtiapine 25  trimethoprim  160 mG/sulfamethoxazole 800 mG 1      ANTIBIOTICS:  abacavir 600 mG/dolutegravir 50 mG/lamiVUDine 300 mG 1 Tablet(s) Oral daily  trimethoprim  160 mG/sulfamethoxazole 800 mG 1 Tablet(s) Oral daily

## 2019-07-23 NOTE — PROGRESS NOTE ADULT - SUBJECTIVE AND OBJECTIVE BOX
SUBJECTIVE:    Patient is a 79y old Male who presents with a chief complaint of Left knee swelling and Pain (23 Jul 2019 09:50). Currently admitted to medicine with the primary diagnosis of Femoral condyle fracture. Today is hospital day 5d. This morning he is resting comfortably in bed when seen and examined. States he is feeling well. Tolerated IR procedure well. Reports pain in his left knee.    PAST MEDICAL & SURGICAL HISTORY  Gout  HTN (hypertension)  HIV (human immunodeficiency virus infection)    ALLERGIES:  No Known Allergies    MEDICATIONS:  STANDING MEDICATIONS  abacavir 600 mG/dolutegravir 50 mG/lamiVUDine 300 mG 1 Tablet(s) Oral daily  apixaban 10 milliGRAM(s) Oral every 12 hours  chlorhexidine 4% Liquid 1 Application(s) Topical <User Schedule>  ergocalciferol 37625 Unit(s) Oral every week  folic acid 1 milliGRAM(s) Oral daily  multivitamin 1 Tablet(s) Oral daily  QUEtiapine 25 milliGRAM(s) Oral at bedtime  trimethoprim  160 mG/sulfamethoxazole 800 mG 1 Tablet(s) Oral daily    PRN MEDICATIONS  acetaminophen   Tablet .. 650 milliGRAM(s) Oral every 6 hours PRN    VITALS:   T(F): 97.7  HR: 74  BP: 148/84  RR: 18  SpO2: --    LABS:                        13.1   8.74  )-----------( 287      ( 23 Jul 2019 05:35 )             39.7     07-23    137  |  101  |  27<H>  ----------------------------<  85  4.9   |  22  |  1.4    Ca    9.9      23 Jul 2019 05:35  Phos  3.7     07-23  Mg     2.1     07-23    TPro  6.9  /  Alb  3.3<L>  /  TBili  0.4  /  DBili  x   /  AST  28  /  ALT  22  /  AlkPhos  123<H>  07-23    PT/INR - ( 23 Jul 2019 05:35 )   PT: 14.60 sec;   INR: 1.27 ratio         PTT - ( 23 Jul 2019 05:35 )  PTT:36.4 sec    RADIOLOGY:  < from: US Biopsy Bone Superficial (07.22.19 @ 15:00) >  EXAM:  US BX BONE NDL SUPRF#          PROCEDURE DATE:  07/22/2019      INTERPRETATION:  INDICATION AND FOCUSED HISTORY: 79-year-old male.    PROCEDURE: Biopsy - US Guided left femur mass    DATE OF PROCEDURE: 7/22/2019 3:00 PM.    PROCEDURAL PERSONNEL:   Attending Physician: Jayjay Montyoa MD  Resident: None.    ANESTHESIA:  1% local lidocaine     The intra-procedural sedation time it was 15 minutes.    CONTRAST: None    ANTIBIOTIC PROPHYLAXIS AND OTHER MEDICATIONS:  Antibiotics: None  ESTIMATED BLOOD LOSS: Minimal, less than 5 ml.    CONDITION: Stable.    COMPLICATIONS: None immediate.    IMAGING MODALITY AND ARCHIVING: US images were saved and sent to PACS   immediately upon completion of the procedure.    FINDINGS:     BiopsyTarget - soft tissue surrounding left femur    Post-procedure imaging - stable    PROCEDURE DESCRIPTION:  The patient?s chart and imaging were reviewed and   a focused history and physical performed.  The nature, purpose, risks and   alternatives of the procedure were explained to  the patient, and   informed consent was received and documented. The patient was brought to   the procedure suite and placed supine on the table.  A pre-procedure team   time-out was performed. Anticoagulation was not administered for VTE   prophylaxis as it is contraindicated in this setting due to the risk of   bleeding.    The patient's left thigh was prepped and draped in standard sterile   fashion (maximum sterile barrier technique). 1% Lidocaine  local   anesthesia was administered.     Using US guidance, the following equipment was utilized:    19 gauge ACHIEVE coaxial system  6 cores were obtained CYTOLOGIST ASSESSMENT: Adequate    SPECIMEN: Cytology and Surgical Pathology, Surgical Pathology    Dr. JAYJAY MONTOYA, the attending physician, was present for the   entire procedure and reviewed all images.    IMPRESSION:     Successful US guided core biopsy of the left femur.    PLAN:    Followup Cytology, Surgical Pathology results.      PHYSICAL EXAM:  GEN: No acute distress, cachectic  PULM/CHEST: Clear to auscultation bilaterally, no rales, rhonchi or wheezes   CVS: Regular rate and rhythm, S1-S2, no murmurs  ABD: Soft, non-tender, non-distended, +BS  EXT: No edema, left leg in extension brace  NEURO: AAOx3

## 2019-07-23 NOTE — PROGRESS NOTE ADULT - ASSESSMENT
ASSESSMENT  80 yo M with AIDS, hx NHL, gout, HTN, active heroin use (snorting, 1mn ago), admitted after a fall a week ago, found to have a left medial epicondylar fx w/ intra-articular extension to trochlear groove, concerning for lymphoma, pathological fracture    RECOMMENDATIONS  #LLE fracture, concerning for pathological fx, concerning for recurrent lymphoma (Lactate Dehydrogenase, Serum: 773 U/L (07.20.19 @ 06:13)  CT imaging showing New enlarged right paraesophageal 1.3 cm short axis lymph node and 1.4 cm short axis subcarinal lymph node;  - appreciate Ortho following  - IR Guided biopsy pathology pending  - CTH neg  - Consult heme/onc  - Contact outpatient [facility] heme/onc for records    #Deep venous thrombosis of the right femoral and popliteal veins. Superficial femoral phlebitis of the right gastrocnemius veins.  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 CD4 178 but CD4% >14 (thus functionally greater than 200),   - abacavir 600 mG/dolutegravir 50 mG/lamiVUDine 300 mG 1  - trimethoprim  1 DS tab daily    #Mood disorder, Etoh, drug use  - QUEtiapine 25  - Thiamine 100mg daily    Spectra [phone]

## 2019-07-24 VITALS
HEART RATE: 81 BPM | DIASTOLIC BLOOD PRESSURE: 73 MMHG | TEMPERATURE: 98 F | RESPIRATION RATE: 18 BRPM | SYSTOLIC BLOOD PRESSURE: 130 MMHG

## 2019-07-24 LAB
% ALBUMIN: 45.4 % — SIGNIFICANT CHANGE UP
% ALPHA 1: 8.9 % — SIGNIFICANT CHANGE UP
% ALPHA 2: 13.8 % — SIGNIFICANT CHANGE UP
% BETA: 12.6 % — SIGNIFICANT CHANGE UP
% GAMMA: 19.3 % — SIGNIFICANT CHANGE UP
ALBUMIN SERPL ELPH-MCNC: 2.7 G/DL — LOW (ref 3.6–5.5)
ALBUMIN/GLOB SERPL ELPH: 0.8 RATIO — SIGNIFICANT CHANGE UP
ALPHA1 GLOB SERPL ELPH-MCNC: 0.5 G/DL — HIGH (ref 0.1–0.4)
ALPHA2 GLOB SERPL ELPH-MCNC: 0.8 G/DL — SIGNIFICANT CHANGE UP (ref 0.5–1)
ANION GAP SERPL CALC-SCNC: 13 MMOL/L — SIGNIFICANT CHANGE UP (ref 7–14)
B-GLOBULIN SERPL ELPH-MCNC: 0.7 G/DL — SIGNIFICANT CHANGE UP (ref 0.5–1)
BUN SERPL-MCNC: 28 MG/DL — HIGH (ref 10–20)
CALCIUM SERPL-MCNC: 9.1 MG/DL — SIGNIFICANT CHANGE UP (ref 8.5–10.1)
CHLORIDE SERPL-SCNC: 102 MMOL/L — SIGNIFICANT CHANGE UP (ref 98–110)
CO2 SERPL-SCNC: 20 MMOL/L — SIGNIFICANT CHANGE UP (ref 17–32)
CREAT SERPL-MCNC: 1.4 MG/DL — SIGNIFICANT CHANGE UP (ref 0.7–1.5)
GAMMA GLOBULIN: 1.1 G/DL — SIGNIFICANT CHANGE UP (ref 0.6–1.6)
GLUCOSE SERPL-MCNC: 91 MG/DL — SIGNIFICANT CHANGE UP (ref 70–99)
HCT VFR BLD CALC: 39.6 % — LOW (ref 42–52)
HGB BLD-MCNC: 13 G/DL — LOW (ref 14–18)
INTERPRETATION SERPL IFE-IMP: SIGNIFICANT CHANGE UP
MCHC RBC-ENTMCNC: 27.8 PG — SIGNIFICANT CHANGE UP (ref 27–31)
MCHC RBC-ENTMCNC: 32.8 G/DL — SIGNIFICANT CHANGE UP (ref 32–37)
MCV RBC AUTO: 84.6 FL — SIGNIFICANT CHANGE UP (ref 80–94)
NON-GYNECOLOGICAL CYTOLOGY STUDY: SIGNIFICANT CHANGE UP
NRBC # BLD: 0 /100 WBCS — SIGNIFICANT CHANGE UP (ref 0–0)
PLATELET # BLD AUTO: 296 K/UL — SIGNIFICANT CHANGE UP (ref 130–400)
POTASSIUM SERPL-MCNC: 4.7 MMOL/L — SIGNIFICANT CHANGE UP (ref 3.5–5)
POTASSIUM SERPL-SCNC: 4.7 MMOL/L — SIGNIFICANT CHANGE UP (ref 3.5–5)
PROT PATTERN SERPL ELPH-IMP: SIGNIFICANT CHANGE UP
RBC # BLD: 4.68 M/UL — LOW (ref 4.7–6.1)
RBC # FLD: 14.6 % — HIGH (ref 11.5–14.5)
SODIUM SERPL-SCNC: 135 MMOL/L — SIGNIFICANT CHANGE UP (ref 135–146)
SURGICAL PATHOLOGY STUDY: SIGNIFICANT CHANGE UP
WBC # BLD: 8.89 K/UL — SIGNIFICANT CHANGE UP (ref 4.8–10.8)
WBC # FLD AUTO: 8.89 K/UL — SIGNIFICANT CHANGE UP (ref 4.8–10.8)

## 2019-07-24 RX ORDER — KETOROLAC TROMETHAMINE 30 MG/ML
15 SYRINGE (ML) INJECTION EVERY 8 HOURS
Refills: 0 | Status: DISCONTINUED | OUTPATIENT
Start: 2019-07-24 | End: 2019-07-24

## 2019-07-24 RX ORDER — APIXABAN 2.5 MG/1
2 TABLET, FILM COATED ORAL
Qty: 0 | Refills: 0 | DISCHARGE
Start: 2019-07-24

## 2019-07-24 RX ADMIN — Medication 1 TABLET(S): at 12:18

## 2019-07-24 RX ADMIN — Medication 1 TABLET(S): at 12:15

## 2019-07-24 RX ADMIN — Medication 650 MILLIGRAM(S): at 12:24

## 2019-07-24 RX ADMIN — Medication 15 MILLIGRAM(S): at 15:25

## 2019-07-24 RX ADMIN — CHLORHEXIDINE GLUCONATE 1 APPLICATION(S): 213 SOLUTION TOPICAL at 05:59

## 2019-07-24 RX ADMIN — Medication 650 MILLIGRAM(S): at 00:20

## 2019-07-24 RX ADMIN — Medication 650 MILLIGRAM(S): at 12:26

## 2019-07-24 RX ADMIN — APIXABAN 10 MILLIGRAM(S): 2.5 TABLET, FILM COATED ORAL at 17:42

## 2019-07-24 RX ADMIN — APIXABAN 10 MILLIGRAM(S): 2.5 TABLET, FILM COATED ORAL at 05:57

## 2019-07-24 RX ADMIN — Medication 650 MILLIGRAM(S): at 06:25

## 2019-07-24 RX ADMIN — Medication 1 MILLIGRAM(S): at 12:16

## 2019-07-24 RX ADMIN — Medication 650 MILLIGRAM(S): at 05:58

## 2019-07-24 NOTE — PROGRESS NOTE ADULT - PROVIDER SPECIALTY LIST ADULT
Infectious Disease
Internal Medicine
Intervent Radiology
Orthopedics
Internal Medicine

## 2019-07-24 NOTE — PROGRESS NOTE ADULT - ASSESSMENT
ASSESSMENT  80 yo M with AIDS, hx NHL, gout, HTN, active heroin use (snorting, 1mn ago), admitted after a fall a week ago, found to have a left medial epicondylar fx w/ intra-articular extension to trochlear groove, concerning for lymphoma, pathological fracture    RECOMMENDATIONS  #LLE fracture, concerning for pathological fx, concerning for recurrent lymphoma (Lactate Dehydrogenase, Serum: 773 U/L (07.20.19 @ 06:13)  CT imaging showing New enlarged right paraesophageal 1.3 cm short axis lymph node and 1.4 cm short axis subcarinal lymph node; thyroid nodule  - appreciate Ortho following  - IR Guided biopsy pathology pending  - CTH neg  - Consult heme/onc  - Contact outpatient [facility] heme/onc for records    #Deep venous thrombosis of the right femoral and popliteal veins. Superficial femoral phlebitis of the right gastrocnemius veins.  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 CD4 178 but CD4% >14 (thus functionally greater than 200),   - abacavir 600 mG/dolutegravir 50 mG/lamiVUDine 300 mG 1   - trimethoprim  1 DS tab daily    #Mood disorder, Etoh, drug use  - QUEtiapine 25  - Thiamine 100mg daily    Spectra [phone]

## 2019-07-24 NOTE — CONSULT NOTE ADULT - SUBJECTIVE AND OBJECTIVE BOX
HPI:  78 yo M  PMH: HTN (not on medications), gout (not on medications and HIV  cc: Left knee pain x 2 days  History: Patient notes left knee pain began two weeks ago. It is described as a dull sensation, tender to touch, nonradiated and constant. He notes he had a fall two days ago that worsened the pain. In addition patient notes that for the last week he has had swelling of the left knee area. He has never had swelling like this before in the past and the swelling began BEFORE the fall happened. He further states he has not taken any medications to help with pain or swelling. Patient notes that he uses a walker at home for ambulatory purposes. He notes the pain began before the fall and is worse with weight bearing activities. Patient notes this is not the first fall this month. Otherwise patient denies headaches fevers, chills, n/v/d/c, cp, palpitations sob, cough, weakness, numbness, tingling, abdominal or back pain. Patient denies dysuria, hematuria, or melena. (18 Jul 2019 16:11). Trauma w/u + for left femoral condyle fx, NWB as  per ortho.      PAST MEDICAL & SURGICAL HISTORY:  Gout  HTN (hypertension)  HIV (human immunodeficiency virus infection)      Hospital Course:    TODAY'S SUBJECTIVE & REVIEW OF SYMPTOMS:     Constitutional WNL   Cardio WNL   Resp WNL   GI WNL  Heme WNL  Endo WNL  Skin WNL  MSK pain  Neuro WNL  Cognitive WNL  Psych WNL      MEDICATIONS  (STANDING):  abacavir 600 mG/dolutegravir 50 mG/lamiVUDine 300 mG 1 Tablet(s) Oral daily  chlorhexidine 4% Liquid 1 Application(s) Topical <User Schedule>  ergocalciferol 39442 Unit(s) Oral every week  folic acid 1 milliGRAM(s) Oral daily  heparin  Injectable 5000 Unit(s) SubCutaneous every 8 hours  multivitamin 1 Tablet(s) Oral daily  predniSONE   Tablet 40 milliGRAM(s) Oral daily  QUEtiapine 25 milliGRAM(s) Oral daily  trimethoprim   80 mG/sulfamethoxazole 400 mG 1 Tablet(s) Oral two times a day    MEDICATIONS  (PRN):  acetaminophen   Tablet .. 650 milliGRAM(s) Oral every 6 hours PRN Mild Pain (1 - 3)      FAMILY HISTORY:      Allergies    No Known Allergies    Intolerances        SOCIAL HISTORY:    [  ] Etoh  [  ] Smoking  [  ] Substance abuse     Home Environment:  [  ] Home Alone  [x  ] Lives with Family  [  ] Home Health Aid    Dwelling:  [x  ] Apartment  [  ] Private House  [  ] Adult Home  [  ] Skilled Nursing Facility      [  ] Short Term  [  ] Long Term  [  ] Stairs       Elevator [  ]    FUNCTIONAL STATUS PTA: (Check all that apply)  Ambulation: [ x  ]Independent    [  ] Dependent     [  ] Non-Ambulatory  Assistive Device: [  ] SA Cane  [  ]  Q Cane  [x  ] Walker  [  ]  Wheelchair  ADL : [ x ] Independent  [  ]  Dependent       Vital Signs Last 24 Hrs  T(C): 36.4 (19 Jul 2019 08:09), Max: 36.6 (18 Jul 2019 23:00)  T(F): 97.5 (19 Jul 2019 08:09), Max: 97.9 (18 Jul 2019 23:00)  HR: 67 (19 Jul 2019 08:09) (67 - 73)  BP: 125/72 (19 Jul 2019 08:09) (108/76 - 155/91)  BP(mean): --  RR: 18 (19 Jul 2019 08:09) (18 - 18)  SpO2: 99% (18 Jul 2019 20:08) (99% - 99%)      PHYSICAL EXAM: Alert & Oriented X3  GENERAL: NAD, well-groomed, well-developed  HEAD:  Atraumatic, Normocephalic  CHEST/LUNG: Clear   HEART: S1S2+  ABDOMEN: Soft, Nontender  EXTREMITIES:  no calf tenderness    NERVOUS SYSTEM:  Cranial Nerves 2-12 intact [  ] Abnormal  [  ]  ROM: WFL all extremities [  ]  Abnormal [x  ]limited lle  Motor Strength: WFL all extremities  [  ]  Abnormal [x  ]limited lle  Sensation: intact to light touch [  ] Abnormal [  ]  Reflexes: Symmetric [  ]  Abnormal [  ]    FUNCTIONAL STATUS:  Bed Mobility: Independent [  ]  Supervision [  ]  Needs Assistance [xx  ]  N/A [  ]  Transfers: Independent [  ]  Supervision [  ]  Needs Assistance [x  ]  N/A [  ]   Ambulation: Independent [  ]  Supervision [  ]  Needs Assistance [  ]  N/A [  ]  ADL: Independent [  ] Requires Assistance [  ] N/A [  ]      LABS:                        11.6   8.18  )-----------( 181      ( 19 Jul 2019 06:31 )             35.3     07-19    137  |  106  |  23<H>  ----------------------------<  102<H>  4.0   |  18  |  1.4    Ca    8.3<L>      19 Jul 2019 06:31  Mg     1.9     07-19    TPro  6.4  /  Alb  3.2<L>  /  TBili  0.6  /  DBili  x   /  AST  26  /  ALT  12  /  AlkPhos  120<H>  07-18          RADIOLOGY & ADDITIONAL STUDIES:    Assesment:
79M w/ HIV who presents to ED after fall 2 days prior. States he does not really remember what he was doing when he fell. Has had pain, swelling, and inability to ambulate since then and came into the ED. Accompanied by friend. History from other providers obtained from son state he has been having frequent falls for the past month. Denies any other injuries. Ambulates at home with a walker. States he is compliant with HIV medications.    PAST MEDICAL & SURGICAL HISTORY:  Gout  HTN (hypertension)  HIV (human immunodeficiency virus infection)    Home Medications:  folic acid 1 mg oral tablet: 1 tab(s) orally once a day (18 Jul 2019 12:33)  QUEtiapine:  (18 Jul 2019 12:33)  sulfamethizole 500 mg oral tablet:  (18 Jul 2019 12:33)  Triumeq oral tablet: 1 tab(s) orally once a day (18 Jul 2019 12:33)    No Known Allergies    Phys Ex  NAD    LLE:  skin intact  significant swelling about the knee, TTP  SILT s/s/sp/dp/t  motor intact EHL FHL  palpable DP, toes wwp                        11.7   9.43  )-----------( 184      ( 18 Jul 2019 13:00 )             36.8   07-18    138  |  107  |  22<H>  ----------------------------<  112<H>  3.8   |  20  |  1.4    Ca    8.5      18 Jul 2019 13:00    TPro  6.4  /  Alb  3.2<L>  /  TBili  0.6  /  DBili  x   /  AST  26  /  ALT  12  /  AlkPhos  120<H>  07-18    Imaging shows left medial epicondylar fx w/ intra-articular extension to trochlear groove    pain control  placed in KI  NWB LLE  rest of care per primary  further recs pending
INTERVENTIONAL RADIOLOGY CONSULT:     Procedure Requested: left femur mass biopsy    HPI:  78 yo M  PMH: HTN (not on medications), gout (not on medications and HIV  cc: Left knee pain x 2 days  History: Patient notes left knee pain began two weeks ago. It is described as a dull sensation, tender to touch, nonradiated and constant. He notes he had a fall two days ago that worsened the pain. In addition patient notes that for the last week he has had swelling of the left knee area. He has never had swelling like this before in the past and the swelling began BEFORE the fall happened. He further states he has not taken any medications to help with pain or swelling. Patient notes that he uses a walker at home for ambulatory purposes. He notes the pain began before the fall and is worse with weight bearing activities. Patient notes this is not the first fall this month. Otherwise patient denies headaches fevers, chills, n/v/d/c, cp, palpitations sob, cough, weakness, numbness, tingling, abdominal or back pain. Patient denies dysuria, hematuria, or melena. (18 Jul 2019 16:11)      PAST MEDICAL & SURGICAL HISTORY:  Gout  HTN (hypertension)  HIV (human immunodeficiency virus infection)      MEDICATIONS  (STANDING):  abacavir 600 mG/dolutegravir 50 mG/lamiVUDine 300 mG 1 Tablet(s) Oral daily  chlorhexidine 4% Liquid 1 Application(s) Topical <User Schedule>  ergocalciferol 77519 Unit(s) Oral every week  folic acid 1 milliGRAM(s) Oral daily  multivitamin 1 Tablet(s) Oral daily  predniSONE   Tablet 40 milliGRAM(s) Oral daily  QUEtiapine 25 milliGRAM(s) Oral at bedtime  trimethoprim   80 mG/sulfamethoxazole 400 mG 1 Tablet(s) Oral two times a day    MEDICATIONS  (PRN):  acetaminophen   Tablet .. 650 milliGRAM(s) Oral every 6 hours PRN Mild Pain (1 - 3)      Allergies    No Known Allergies    Intolerances      FAMILY HISTORY:      Physical Exam:   Vital Signs Last 24 Hrs  T(C): 36.1 (22 Jul 2019 07:43), Max: 36.6 (21 Jul 2019 16:16)  T(F): 97 (22 Jul 2019 07:43), Max: 97.9 (21 Jul 2019 16:16)  HR: 69 (22 Jul 2019 07:43) (65 - 69)  BP: 140/82 (22 Jul 2019 07:43) (133/77 - 147/91)  BP(mean): --  RR: 18 (22 Jul 2019 07:43) (18 - 18)  SpO2: --      Labs:                         12.3   8.46  )-----------( 268      ( 22 Jul 2019 06:56 )             37.3     07-22    137  |  102  |  27<H>  ----------------------------<  92  4.7   |  22  |  1.3    Ca    9.3      22 Jul 2019 06:56    TPro  6.6  /  Alb  3.1<L>  /  TBili  0.4  /  DBili  x   /  AST  23  /  ALT  16  /  AlkPhos  115  07-22    PT/INR - ( 22 Jul 2019 06:56 )   PT: 11.40 sec;   INR: 0.99 ratio         PTT - ( 22 Jul 2019 06:56 )  PTT:39.4 sec    Pertinent labs:                      12.3   8.46  )-----------( 268      ( 22 Jul 2019 06:56 )             37.3       07-22    137  |  102  |  27<H>  ----------------------------<  92  4.7   |  22  |  1.3    Ca    9.3      22 Jul 2019 06:56    TPro  6.6  /  Alb  3.1<L>  /  TBili  0.4  /  DBili  x   /  AST  23  /  ALT  16  /  AlkPhos  115  07-22      PT/INR - ( 22 Jul 2019 06:56 )   PT: 11.40 sec;   INR: 0.99 ratio         PTT - ( 22 Jul 2019 06:56 )  PTT:39.4 sec    Radiology & Additional Studies:     < from: NM SPECT Bone Scan (07.19.19 @ 22:00) >  Impression:    1. Heterogeneous, abnormal osseous and extraosseous radiotracer uptakeat   left distal femur, corresponding to left distal femoral mass with   pathologic fracture on CT.     2. No additional sites of suspicious osseous uptake.    3. Contiguous foci of increased radiotracer uptake at left lateral 8-9th   ribs, corresponding to posttraumatic-appearing fracture deformity on CT   with additional uptake at anterior left 7th rib, also likely   posttraumatic.    < end of copied text >      Radiology imaging reviewed.       ASSESSMENT/ PLAN:   78yo male with PMH of HIV, gout, active heroin use, admitted after a fall two days ago, found to have left medial epicondylar fracture with intra-articular extension to trochlear groove, concerning for lymphoma, pathological fracture. Bone scan confirms left distal femoral mass with pathological fracture.  - consulted for left femur mass biopsy - r/o lymphoma  - plan for image guided biopsy today 7/22  - discussed with resident: heparin drip was stopped at 8am    Thank you for the courtesy of this consult, please call [phone] with any further questions.
Patient is a 79y old  Male who presents with a chief complaint of Left knee swelling and Pain (24 Jul 2019 10:52)      HPI:  78 yo M  PMH: HTN (not on medications), gout (not on medications and HIV  cc: Left knee pain x 2 days  History: Patient notes left knee pain began two weeks ago. It is described as a dull sensation, tender to touch, nonradiated and constant. He notes he had a fall two days ago that worsened the pain. In addition patient notes that for the last week he has had swelling of the left knee area. He has never had swelling like this before in the past and the swelling began BEFORE the fall happened. He further states he has not taken any medications to help with pain or swelling. Patient notes that he uses a walker at home for ambulatory purposes. He notes the pain began before the fall and is worse with weight bearing activities. Patient notes this is not the first fall this month. Otherwise patient denies headaches fevers, chills, n/v/d/c, cp, palpitations sob, cough, weakness, numbness, tingling, abdominal or back pain. Patient denies dysuria, hematuria, or melena. (18 Jul 2019 16:11)    Consultation requested for suspected lymphoma of left knee.  Patient has pathologic fracture of left knee.  Patient had biopsy done by Interventional radiology yesterday, results pending.  Patient complaining of lot of left knee pain.  Denies any other pain, or complaints.         ROS:  Negative except for:    PAST MEDICAL & SURGICAL HISTORY:  Gout  HTN (hypertension)  HIV (human immunodeficiency virus infection)      SOCIAL HISTORY:    FAMILY HISTORY:      MEDICATIONS  (STANDING):  abacavir 600 mG/dolutegravir 50 mG/lamiVUDine 300 mG 1 Tablet(s) Oral daily  apixaban 10 milliGRAM(s) Oral every 12 hours  chlorhexidine 4% Liquid 1 Application(s) Topical <User Schedule>  ergocalciferol 63263 Unit(s) Oral every week  folic acid 1 milliGRAM(s) Oral daily  multivitamin 1 Tablet(s) Oral daily  QUEtiapine 25 milliGRAM(s) Oral at bedtime  trimethoprim  160 mG/sulfamethoxazole 800 mG 1 Tablet(s) Oral daily    MEDICATIONS  (PRN):  acetaminophen   Tablet .. 650 milliGRAM(s) Oral every 6 hours PRN Mild Pain (1 - 3)  ketorolac   Injectable 15 milliGRAM(s) IV Push every 8 hours PRN Severe Pain (7 - 10)      Allergies    No Known Allergies    Intolerances        Vital Signs Last 24 Hrs  T(C): 36.2 (24 Jul 2019 07:34), Max: 36.6 (23 Jul 2019 23:59)  T(F): 97.2 (24 Jul 2019 07:34), Max: 97.9 (23 Jul 2019 23:59)  HR: 66 (24 Jul 2019 07:34) (66 - 82)  BP: 129/76 (24 Jul 2019 07:34) (129/76 - 148/84)  BP(mean): --  RR: 18 (24 Jul 2019 07:34) (18 - 18)  SpO2: --    PHYSICAL EXAM  General: adult in NAD  HEENT: clear oropharynx, anicteric sclera, pink conjunctiva  Neck: supple  CV: normal S1/S2 with no murmur rubs or gallops  Lungs: positive air movement b/l ant lungs,clear to auscultation, no wheezes, no rales  Abdomen: soft non-tender non-distended, no hepatosplenomegaly  Ext: Left knee swollen and it is in a soft brace.  Skin: no rashes and no petechiae  Neuro: alert and oriented X 4, no focal deficits      LABS:                          13.0   8.89  )-----------( 296      ( 24 Jul 2019 05:58 )             39.6         Mean Cell Volume : 84.6 fL  Mean Cell Hemoglobin : 27.8 pg  Mean Cell Hemoglobin Concentration : 32.8 g/dL  Auto Neutrophil # : x  Auto Lymphocyte # : x  Auto Monocyte # : x  Auto Eosinophil # : x  Auto Basophil # : x  Auto Neutrophil % : x  Auto Lymphocyte % : x  Auto Monocyte % : x  Auto Eosinophil % : x  Auto Basophil % : x      Serial CBC's  07-24 @ 05:58  Hct-39.6 / Hgb-13.0 / Plat-296 / RBC-4.68 / WBC-8.89  Serial CBC's  07-23 @ 05:35  Hct-39.7 / Hgb-13.1 / Plat-287 / RBC-4.71 / WBC-8.74  Serial CBC's  07-22 @ 06:56  Hct-37.3 / Hgb-12.3 / Plat-268 / RBC-4.41 / WBC-8.46  Serial CBC's  07-21 @ 07:35  Hct-35.5 / Hgb-11.9 / Plat-255 / RBC-4.19 / WBC-8.71      07-24    135  |  102  |  28<H>  ----------------------------<  91  4.7   |  20  |  1.4    Ca    9.1      24 Jul 2019 05:58  Phos  3.7     07-23  Mg     2.1     07-23    TPro  6.9  /  Alb  3.3<L>  /  TBili  0.4  /  DBili  x   /  AST  28  /  ALT  22  /  AlkPhos  123<H>  07-23      PT/INR - ( 23 Jul 2019 05:35 )   PT: 14.60 sec;   INR: 1.27 ratio         PTT - ( 23 Jul 2019 05:35 )  PTT:36.4 sec                BLOOD SMEAR INTERPRETATION:       RADIOLOGY & ADDITIONAL STUDIES:

## 2019-07-24 NOTE — CONSULT NOTE ADULT - ASSESSMENT
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  Pathologic fracture of left knee.  Biopsy done by Interventional radiology yesterday, results pending.  AIDS  History of drug abuse.  Hypertension.    We will await pathology results and plan further treatment accordingly. 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  Pathologic fracture of left knee.  Biopsy done by Interventional radiology yesterday, results pending.  AIDS  History of drug abuse.  Hypertension.    We will await pathology results and plan further treatment accordingly.    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  Immunohistochemistry pending.  Patient is awaiting transfer to rehab today.  We will follow him in my office located at [address] [place] [address] (Telephone: [phone] ) at Noon time on 07/25/2019 or 9:30 AM on 07/29/2019.  Discussed the same over the telephone with

## 2019-07-24 NOTE — DISCHARGE NOTE PROVIDER - NSDCFUADDAPPT_GEN_ALL_CORE_FT
With Dr. Dudley (Orthopedics): August 6 at 2 PM.   With Dr. Herzog (Hematology-Oncology): July 25 at 12:00 PM
unable to assess

## 2019-07-24 NOTE — DISCHARGE NOTE PROVIDER - CARE PROVIDERS DIRECT ADDRESSES
[email or web address]

## 2019-07-24 NOTE — DISCHARGE NOTE NURSING/CASE MANAGEMENT/SOCIAL WORK - NSDCDPATPORTLINK_GEN_ALL_CORE
You can access the [facility] Patient Portal, offered by [facility], by registering with the following website: [email or web address]

## 2019-07-24 NOTE — DISCHARGE NOTE PROVIDER - NSDCCPCAREPLAN_GEN_ALL_CORE_FT
PRINCIPAL DISCHARGE DIAGNOSIS  Diagnosis: Femoral condyle fracture  Assessment and Plan of Treatment: You came to the hospital after experincing a fall and found to have a fracture in the low part of your femur (thigh) bone. Orthopedics followed your case and you were found to have a pathologic fracture due to lym      SECONDARY DISCHARGE DIAGNOSES  Diagnosis: Fall  Assessment and Plan of Treatment:     Diagnosis: Inability to ambulate due to left knee  Assessment and Plan of Treatment: PRINCIPAL DISCHARGE DIAGNOSIS  Diagnosis: Femoral condyle fracture  Assessment and Plan of Treatment: You came to the hospital after experincing a fall and found to have a fracture in the low part of your femur (thigh) bone. Orthopedics followed your case and you were found to have a pathologic fracture due to possible malignancy.  You must follow up with your oncologist. You have an appointment on 7/25/2019 at Noon with your oncologist. You also have an appointment with your orthpedic surgeon on August 6th at 2 pm.    It is IMPORTANT that you see your doctors to better care for your health problems.      SECONDARY DISCHARGE DIAGNOSES  Diagnosis: Fall  Assessment and Plan of Treatment:     Diagnosis: Inability to ambulate due to left knee  Assessment and Plan of Treatment:

## 2019-07-24 NOTE — CHART NOTE - NSCHARTNOTEFT_GEN_A_CORE
<<<RESIDENT DISCHARGE NOTE>>>     DARY TRAORE  MRN-[id number]    VITAL SIGNS:  T(F): 98.4 (07-24-19 @ 16:09), Max: 98.4 (07-24-19 @ 16:09)  HR: 81 (07-24-19 @ 16:09)  BP: 130/73 (07-24-19 @ 16:09)  SpO2: --      PHYSICAL EXAMINATION:  General: NAD  Head & Neck: NCAT, trachea midline  Pulmonary: CTA b/l, no rales, wheezes, rhonchi  Cardiovascular: RRR, +S1, +S2, no M/R/G  Gastrointestinal/Abdomen & Pelvis: soft, non-tender to palpation, non-distended, +BS  Neurologic/Motor: no deficits noted  Extremities: left leg in extension brace. NWB    TEST RESULTS:                        13.0   8.89  )-----------( 296      ( 24 Jul 2019 05:58 )             39.6       07-24    135  |  102  |  28<H>  ----------------------------<  91  4.7   |  20  |  1.4    Ca    9.1      24 Jul 2019 05:58  Phos  3.7     07-23  Mg     2.1     07-23    TPro  6.9  /  Alb  3.3<L>  /  TBili  0.4  /  DBili  x   /  AST  28  /  ALT  22  /  AlkPhos  123<H>  07-23      FINAL DISCHARGE INTERVIEW:  Resident(s) Present: (Name:____Amarjit_________)    DISCHARGE MEDICATION RECONCILIATION  reviewed with Attending (Name:_Karina_________)    DISPOSITION:   [  ] Home,    [  ] Home with Visiting Nursing Services,   [   x ]  SNF/ NH,    [   ] Acute Rehab (4A),   [   ] Other (Specify:_________)

## 2019-07-24 NOTE — PROGRESS NOTE ADULT - SUBJECTIVE AND OBJECTIVE BOX
EUGENIEDARY  79y, Male  Allergy: No Known Allergies      CHIEF COMPLAINT: Left knee swelling and Pain (23 Jul 2019 16:54)      INTERVAL EVENTS/HPI  - No acute events overnight  - white powder bags found by bedside, utox neg  - RPR neg   - T(F): , Max: 97.9 (07-23-19 @ 23:59)  - Denies any worsening symptoms  - Tolerating medication  - WBC Count: 8.89 K/uL (07-24-19 @ 05:58)      ROS  General: Denies rigors, nightsweats  HEENT: Denies headache, rhinorrhea, sore throat, eye pain  CV: Denies CP, palpitations  PULM: Denies SOB, wheezing  GI: Denies abdominal pain, hematochezia/melena  : Denies dysuria, hematuria  MSK: Denies arthralgias, myalgias  SKIN: Denies rash, lesions  NEURO: Denies paresthesias, weakness  PSYCH: Denies depression, anxiety    VITALS:  T(F): 97.2, Max: 97.9 (07-23-19 @ 23:59)  HR: 66  BP: 129/76  RR: 18Vital Signs Last 24 Hrs  T(C): 36.2 (24 Jul 2019 07:34), Max: 36.6 (23 Jul 2019 23:59)  T(F): 97.2 (24 Jul 2019 07:34), Max: 97.9 (23 Jul 2019 23:59)  HR: 66 (24 Jul 2019 07:34) (66 - 82)  BP: 129/76 (24 Jul 2019 07:34) (129/76 - 148/84)  BP(mean): --  RR: 18 (24 Jul 2019 07:34) (18 - 18)  SpO2: --    PHYSICAL EXAM:  Gen: NAD, resting in bed  HEENT: Normocephalic, atraumatic  Neck: supple, no lymphadenopathy  CV: Regular rate & regular rhythm  Lungs: decreased BS at bases, no fremitus  Abdomen: Soft, BS present  Ext: Warm, well perfused, LLE brace  Neuro: non focal, awake  Skin: no rash, no erythema  Lines: no phlebitis      FH: Non-contributory  Social Hx: Non-contributory    TESTS & MEASUREMENTS:                        13.0   8.89  )-----------( 296      ( 24 Jul 2019 05:58 )             39.6     07-24    135  |  102  |  28<H>  ----------------------------<  91  4.7   |  20  |  1.4    Ca    9.1      24 Jul 2019 05:58  Phos  3.7     07-23  Mg     2.1     07-23    TPro  6.9  /  Alb  3.3<L>  /  TBili  0.4  /  DBili  x   /  AST  28  /  ALT  22  /  AlkPhos  123<H>  07-23    eGFR if Non African American: 47 mL/min/1.73M2 (07-24-19 @ 05:58)  eGFR if African American: 54 mL/min/1.73M2 (07-24-19 @ 05:58)    LIVER FUNCTIONS - ( 23 Jul 2019 05:35 )  Alb: 3.3 g/dL / Pro: 6.9 g/dL / ALK PHOS: 123 U/L / ALT: 22 U/L / AST: 28 U/L / GGT: x                     INFECTIOUS DISEASES TESTING      RADIOLOGY & ADDITIONAL TESTS:  I have personally reviewed the last Chest xray  CXR      CT      CARDIOLOGY TESTING  12 Lead ECG:   Ventricular Rate 68 BPM    Atrial Rate 100 BPM    P-R Interval 180 ms    QRS Duration 104 ms    Q-T Interval 426 ms    QTC Calculation(Bezet) 452 ms    P Axis 65 degrees    R Axis -1 degrees    T Axis 27 degrees    Diagnosis Line *** Poor data quality, interpretation may be adversely affected  Sinus rhythm with Premature atrial complexes  Otherwise normal ECG    Confirmed by Addi Gay ([id number]) on 7/18/2019 2:48:29 PM (07-18-19 @ 14:33)      MEDICATIONS  abacavir 600 mG/dolutegravir 50 mG/lamiVUDine 300 mG 1  apixaban 10  chlorhexidine 4% Liquid 1  ergocalciferol 92729  folic acid 1  multivitamin 1  QUEtiapine 25  trimethoprim  160 mG/sulfamethoxazole 800 mG 1      ANTIBIOTICS:  abacavir 600 mG/dolutegravir 50 mG/lamiVUDine 300 mG 1 Tablet(s) Oral daily  trimethoprim  160 mG/sulfamethoxazole 800 mG 1 Tablet(s) Oral daily

## 2019-07-24 NOTE — PROGRESS NOTE ADULT - REASON FOR ADMISSION
Left knee swelling and Pain

## 2019-07-24 NOTE — DISCHARGE NOTE PROVIDER - CARE PROVIDER_API CALL
Jef Dudley)  Orthopaedic Surgery  [address] [place] [address]  Phone: [phone]  Fax: [phone]  Follow Up Time:     Sallie Herzog)  Hematology; Internal Medicine; Medical Oncology  [address] [place] [address]  Phone: [phone]  Fax: [phone]  Follow Up Time:

## 2019-07-25 LAB — TM INTERPRETATION: SIGNIFICANT CHANGE UP

## 2019-07-29 LAB
CHROM ANALY INTERPHASE BLD FISH-IMP: SIGNIFICANT CHANGE UP
TM INTERPRETATION: SIGNIFICANT CHANGE UP

## 2019-07-30 DIAGNOSIS — C85.90 NON-HODGKIN LYMPHOMA, UNSPECIFIED, UNSPECIFIED SITE: ICD-10-CM

## 2019-07-30 DIAGNOSIS — F11.90 OPIOID USE, UNSPECIFIED, UNCOMPLICATED: ICD-10-CM

## 2019-07-30 DIAGNOSIS — I82.411 ACUTE EMBOLISM AND THROMBOSIS OF RIGHT FEMORAL VEIN: ICD-10-CM

## 2019-07-30 DIAGNOSIS — M84.552A PATHOLOGICAL FRACTURE IN NEOPLASTIC DISEASE, LEFT FEMUR, INITIAL ENCOUNTER FOR FRACTURE: ICD-10-CM

## 2019-07-30 DIAGNOSIS — I82.431 ACUTE EMBOLISM AND THROMBOSIS OF RIGHT POPLITEAL VEIN: ICD-10-CM

## 2019-07-30 DIAGNOSIS — M10.9 GOUT, UNSPECIFIED: ICD-10-CM

## 2019-07-30 DIAGNOSIS — Z87.891 PERSONAL HISTORY OF NICOTINE DEPENDENCE: ICD-10-CM

## 2019-07-30 DIAGNOSIS — E55.9 VITAMIN D DEFICIENCY, UNSPECIFIED: ICD-10-CM

## 2019-07-30 DIAGNOSIS — F02.80 DEMENTIA IN OTHER DISEASES CLASSIFIED ELSEWHERE, UNSPECIFIED SEVERITY, WITHOUT BEHAVIORAL DISTURBANCE, PSYCHOTIC DISTURBANCE, MOOD DISTURBANCE, AND ANXIETY: ICD-10-CM

## 2019-07-30 DIAGNOSIS — F10.14 ALCOHOL ABUSE WITH ALCOHOL-INDUCED MOOD DISORDER: ICD-10-CM

## 2019-07-30 DIAGNOSIS — M25.562 PAIN IN LEFT KNEE: ICD-10-CM

## 2019-07-30 DIAGNOSIS — I10 ESSENTIAL (PRIMARY) HYPERTENSION: ICD-10-CM

## 2019-07-30 DIAGNOSIS — E53.8 DEFICIENCY OF OTHER SPECIFIED B GROUP VITAMINS: ICD-10-CM

## 2019-07-30 DIAGNOSIS — B20 HUMAN IMMUNODEFICIENCY VIRUS [HIV] DISEASE: ICD-10-CM

## 2019-07-30 DIAGNOSIS — R29.6 REPEATED FALLS: ICD-10-CM

## 2019-07-30 NOTE — PHYSICAL THERAPY INITIAL EVALUATION ADULT - REHAB POTENTIAL, PT EVAL
no abdominal pain, no bloating, no constipation, no diarrhea, no nausea and no vomiting. good, to achieve stated therapy goals

## 2019-07-31 LAB — CHROM ANALY OVERALL INTERP SPEC-IMP: SIGNIFICANT CHANGE UP

## 2019-08-07 LAB — CHROM ANALY OVERALL INTERP SPEC-IMP: SIGNIFICANT CHANGE UP

## 2021-06-08 NOTE — PHYSICAL THERAPY INITIAL EVALUATION ADULT - SITTING BALANCE: STATIC
Health Maintenance Due   Topic Date Due   • Shingles Vaccine (1 of 2) Never done   • Colorectal Cancer Screen-  02/28/2019   • Diabetes A1C  04/20/2021   • Diabetes Eye Exam  06/26/2021   • Medicare Wellness Visit  10/20/2021   • Depression Screening  10/20/2021       Patient is due for topics as listed above but is not proceeding with Immunization(s) Shingles at this time. Orders placed for Colorectal Cancer Screening: Cologuard and Diabetes A1C.         good balance

## 2021-09-15 NOTE — H&P ADULT - ASSESSMENT
78 yo M  PMH: HTN (not on medications), gout (not on medications and HIV  cc: Left knee pain x 2 days    ASSESSMENT / PLAN:    # S/P Multiple falls  - PT Rehab  - Physiatry  - Continue to utilizer walker    # Comminuted Medial Femoral Condyle Fracture;  - Ortho consulted  - Knee Immobilizer applied  - Pain Control prn  - Non-weight bearing to Left LE    # Gout: likely a flare  - Prednisone 40mg PO QD  - Ortho evaluation for drainage of knee effusion     # HTN: Monitor off medications as patient is not on medications    # HIV: Continue Triumeq    # Other:  - dvt ppx  - gi ppx  - chg daily and prn  - diet: regular  - Full Code 78 yo M  PMH: HTN (not on medications), gout (not on medications and HIV  cc: Left knee pain x 2 days    ASSESSMENT / PLAN:    # S/P Multiple falls  - PT Rehab  - Physiatry  - Continue to utilizer walker    # Comminuted Medial Femoral Condyle Fracture;  - Ortho consulted  - Knee Immobilizer applied  - Pain Control prn  - Non-weight bearing to Left LE    # Gout: likely a flare  - Prednisone 40mg PO QD  - Ortho evaluation for drainage of knee effusion - send fluid for analysis: crystals, cell count, culture  - No fever of leucocytosis but increased neutrophil predominance and granulocytes, will monitor off abx     # HTN: Monitor off medications as patient is not on medications    # HIV: Continue Triumeq    # Other:  - dvt ppx  - gi ppx  - chg daily and prn  - diet: regular  - Full Code not applicable

## 2022-12-30 NOTE — ED ADULT NURSE NOTE - MUSCULOSKELETAL ASSESSMENT
----- Message from Karrie Stapleton LPN sent at 12/29/2022  1:29 PM CST -----  Contact: Patient    ----- Message -----  From: Umesh Chavez  Sent: 12/29/2022  11:31 AM CST  To: [facility] Staff    Type:  Patient Call          Who Called: Patient         Does the patient know what this is regarding?: Requesting a call back and to schedule a appt to establish care ; please advise           Would the patient rather a call back or a response via [facility]? Call           Best Call Back Number: [phone]             Additional Information:           - - -

## 2023-06-20 NOTE — PROGRESS NOTE ADULT - SUBJECTIVE AND OBJECTIVE BOX
EUGENIEDARY  79y, Male      OVERNIGHT EVENTS:    no fevers, mild discomfort LLE    VITALS:  T(F): 97.4, Max: 97.4 (07-20-19 @ 08:00)  HR: 66  BP: 131/79  RR: 19Vital Signs Last 24 Hrs  T(C): 36.3 (20 Jul 2019 08:00), Max: 36.3 (20 Jul 2019 08:00)  T(F): 97.4 (20 Jul 2019 08:00), Max: 97.4 (20 Jul 2019 08:00)  HR: 66 (20 Jul 2019 08:00) (62 - 69)  BP: 131/79 (20 Jul 2019 08:00) (127/63 - 142/68)  BP(mean): --  RR: 19 (20 Jul 2019 08:00) (18 - 19)  SpO2: --    TESTS & MEASUREMENTS:                        11.8   8.00  )-----------( 202      ( 20 Jul 2019 06:13 )             35.8     07-20    140  |  107  |  23<H>  ----------------------------<  93  4.0   |  20  |  1.3    Ca    8.8      20 Jul 2019 06:13  Mg     1.9     07-19    TPro  6.0  /  Alb  3.0<L>  /  TBili  0.3  /  DBili  x   /  AST  16  /  ALT  10  /  AlkPhos  114  07-20    LIVER FUNCTIONS - ( 20 Jul 2019 06:13 )  Alb: 3.0 g/dL / Pro: 6.0 g/dL / ALK PHOS: 114 U/L / ALT: 10 U/L / AST: 16 U/L / GGT: x                   RADIOLOGY & ADDITIONAL TESTS:    ANTIBIOTICS:  abacavir 600 mG/dolutegravir 50 mG/lamiVUDine 300 mG 1 Tablet(s) Oral daily  trimethoprim   80 mG/sulfamethoxazole 400 mG 1 Tablet(s) Oral two times a day No